# Patient Record
Sex: FEMALE | Race: BLACK OR AFRICAN AMERICAN | Employment: FULL TIME | URBAN - METROPOLITAN AREA
[De-identification: names, ages, dates, MRNs, and addresses within clinical notes are randomized per-mention and may not be internally consistent; named-entity substitution may affect disease eponyms.]

---

## 2016-07-05 LAB — GRBS, EXTERNAL: NORMAL

## 2016-12-29 LAB
ANTIBODY SCREEN, EXTERNAL: NORMAL
CHLAMYDIA, EXTERNAL: NORMAL
HBSAG, EXTERNAL: NORMAL
HIV, EXTERNAL: NORMAL
N. GONORRHEA, EXTERNAL: NORMAL
RPR, EXTERNAL: NORMAL
RUBELLA, EXTERNAL: NORMAL
TYPE, ABO & RH, EXTERNAL: NORMAL

## 2017-07-13 ENCOUNTER — HOSPITAL ENCOUNTER (EMERGENCY)
Age: 32
Discharge: STILL ADMITTED OR ADMINISTRATIVE | End: 2017-07-13
Attending: FAMILY MEDICINE

## 2017-07-17 ENCOUNTER — HOSPITAL ENCOUNTER (OUTPATIENT)
Age: 32
Setting detail: OBSERVATION
Discharge: HOME | End: 2017-07-17
Attending: OBSTETRICS & GYNECOLOGY | Admitting: OBSTETRICS & GYNECOLOGY
Payer: COMMERCIAL

## 2017-07-17 VITALS
HEART RATE: 92 BPM | DIASTOLIC BLOOD PRESSURE: 51 MMHG | TEMPERATURE: 98.2 F | RESPIRATION RATE: 16 BRPM | BODY MASS INDEX: 36.19 KG/M2 | HEIGHT: 64 IN | WEIGHT: 212 LBS | SYSTOLIC BLOOD PRESSURE: 102 MMHG

## 2017-07-17 PROBLEM — Z33.1 IUP (INTRAUTERINE PREGNANCY), INCIDENTAL: Status: ACTIVE | Noted: 2017-07-17

## 2017-07-17 PROCEDURE — 65270000029 HC RM PRIVATE

## 2017-07-17 PROCEDURE — 59025 FETAL NON-STRESS TEST: CPT

## 2017-07-17 PROCEDURE — 99218 HC RM OBSERVATION: CPT

## 2017-07-17 RX ORDER — METFORMIN HYDROCHLORIDE 1000 MG/1
1000 TABLET ORAL 2 TIMES DAILY WITH MEALS
COMMUNITY
End: 2019-04-03

## 2017-07-17 NOTE — IP AVS SNAPSHOT
Summary of Care Report The Summary of Care report has been created to help improve care coordination. Users with access to [employer] or [address] (Web-based application) may access additional patient information including the Discharge Summary. If you are not currently a [address] user and need more information, please call the number listed below in the [place] [address] section and ask to be connected with Medical Records. Facility Information Name Address Phone [address] [address] [phone] Patient Information Patient Name Sex BRENDA Walton ([id number]) Female 1985 Discharge Information Admitting Provider Service Area Unit Chiquita Almodovar MD / [phone] [address] [place] [address] / [phone] Discharge Provider Discharge Date/Time Discharge Disposition Destination (none) 2017 (Pending) AHR (none) Patient Language Language ENGLISH [13] Hospital Problems as of 2017  Never Reviewed Class Noted - Resolved Last Modified POA Active Problems IUP (intrauterine pregnancy), incidental  2017 - Present 2017 by Chiquita Almodovar MD Unknown Entered by Chiquita Almodovar MD  
  
You are allergic to the following No active allergies Current Discharge Medication List  
  
ASK your doctor about these medications Dose & Instructions Dispensing Information Comments  
 metFORMIN 1,000 mg tablet Commonly known as:  GLUCOPHAGE Dose:  1000 mg Take 1,000 mg by mouth two (2) times daily (with meals). Refills:  0 PNV-DHA PO Take  by mouth. Refills:  0 Follow-up Information Follow up With Details Comments Contact Info None   None (395) Patient stated that they have no PCP Discharge Instructions Counting Your Baby's Kicks: Care Instructions Your Care Instructions Counting your baby's kicks is one way your doctor can tell that your baby is healthy. Most womenespecially in a first pregnancyfeel their baby move for the first time between 16 and 22 weeks. The movement may feel like flutters rather than kicks. Your baby may move more at certain times of the day. When you are active, you may notice less kicking than when you are resting. At your prenatal visits, your doctor will ask whether the baby is active. In your last trimester, your doctor may ask you to count the number of times you feel your baby move. Follow-up care is a key part of your treatment and safety. Be sure to make and go to all appointments, and call your doctor if you are having problems. It's also a good idea to know your test results and keep a list of the medicines you take. How do you count fetal kicks? · A common method of checking your baby's movement is to count the number of kicks or moves you feel in 1 hour. Ten movements (such as kicks, flutters, or rolls) in 1 hour are normal. Some doctors suggest that you count in the morning until you get to 10 movements. Then you can quit for that day and start again the next day. · Pick your baby's most active time of day to count. This may be any time from morning to evening. · If you do not feel 10 movements in an hour, your baby may be sleeping. Wait for the next hour and count again. When should you call for help? Call your doctor now or seek immediate medical care if: 
· You noticed that your baby has stopped moving or is moving much less than normal. 
Watch closely for changes in your health, and be sure to contact your doctor if you have any problems. Where can you learn more? Go to http://katiuska-elisabeth.info/. Enter P902 in the search box to learn more about \"Counting Your Baby's Kicks: Care Instructions. \" Current as of: March 16, 2017 Content Version: 11.3 © 2299-1602 [employer]. Care instructions adapted under license by [employer] (which disclaims liability or warranty for this information). If you have questions about a medical condition or this instruction, always ask your healthcare professional. Tanmay Bullock any warranty or liability for your use of this information. Week 37 of Your Pregnancy: Care Instructions Your Care Instructions You are near the end of your pregnancyand you're probably pretty uncomfortable. It may be harder to walk around. Lying down probably isn't comfortable either. You may have trouble getting to sleep or staying asleep. Most women deliver their babies between 40 and 41 weeks. This is a good time to think about packing a bag for the hospital with items you'll need. Then you'll be ready when labor starts. Follow-up care is a key part of your treatment and safety. Be sure to make and go to all appointments, and call your doctor if you are having problems. It's also a good idea to know your test results and keep a list of the medicines you take. How can you care for yourself at home? Learn about breastfeeding · Breastfeeding is best for your baby and good for you. · Breast milk has antibodies to help your baby fight infections. · Mothers who breastfeed often lose weight faster, because making milk burns calories. · Learning the best ways to hold your baby will make breastfeeding easier. · Let your partner bathe and diaper the baby to keep your partner from feeling left out. Snuggle together when you breastfeed. · You may want to learn how to use a breast pump and store your milk. · If you choose to bottle feed, make the feeding feel like breastfeeding so you can bond with your baby. Always hold your baby and the bottle. Do not prop bottles or let your baby fall asleep with a bottle. Learn about crying · It is common for babies to cry for 1 to 3 hours a day. Some cry more, some cry less. · Babies don't cry to make you upset or because you are a bad parent. · Crying is how your baby communicates. Your baby may be hungry; have gas; need a diaper change; or feel cold, warm, tired, lonely, or tense. Sometimes babies cry for unknown reasons. · If you respond to your baby's needs, he or she will learn to trust you. · Try to stay calm when your baby cries. Your baby may get more upset if he or she senses that you are upset. Know how to care for your  · Your baby's umbilical cord stump will drop off on its own, usually between 1 and 2 weeks. To care for your baby's umbilical cord area: ¨ Clean the area at the bottom of the cord 2 or 3 times a day. ¨ Pay special attention to the area where the cord attaches to the skin. ¨ Keep the diaper folded below the cord. ¨ Use a damp washcloth or cotton ball to sponge bathe your baby until the stump has come off. · Your baby's first dark stool is called meconium. After the meconium is passed, your baby will develop his or her own bowel pattern. ¨ Some babies, especially  babies, have several bowel movements a day. Others have one or two a day, or one every 2 to 3 days. ¨  babies often have loose, yellow stools. Formula-fed babies have more formed stools. ¨ If your baby's stools look like little pellets, he or she is constipated. After 2 days of constipation, call your baby's doctor. · If your baby will be circumcised, you can care for him at home. ¨ Gently rinse his penis with warm water after every diaper change. Do not try to remove the film that forms on the penis. This film will go away on its own. Pat dry. ¨ Put petroleum ointment, such as Vaseline, on the area of the diaper that will touch your baby's penis. This will keep the diaper from sticking to your baby. ¨ Ask the doctor about giving your baby acetaminophen (Tylenol) for pain. Where can you learn more? Go to http://katiuska-elisabeth.info/. Enter 68 21 [id number] in the search box to learn more about \"Week 37 of Your Pregnancy: Care Instructions. \" Current as of: March 16, 2017 Content Version: 11.3 © 7226-5701 [employer]. Care instructions adapted under license by [employer] (which disclaims liability or warranty for this information). If you have questions about a medical condition or this instruction, always ask your healthcare professional. [place] [address] any warranty or liability for your use of this information. Learning About When to Call Your Doctor During Pregnancy (After 20 Weeks) Your Care Instructions It's common to have concerns about what might be a problem during pregnancy. Although most pregnant women don't have any serious problems, it's important to know when to call your doctor if you have certain symptoms or signs of labor. These are general suggestions. Your doctor may give you some more information about when to call. When to call your doctor (after 20 weeks) Call 911 anytime you think you may need emergency care. For example, call if: 
· You have severe vaginal bleeding. · You have sudden, severe pain in your belly. · You passed out (lost consciousness). · You have a seizure. · You see or feel the umbilical cord. · You think you are about to deliver your baby and can't make it safely to the hospital. 
Call your doctor now or seek immediate medical care if: 
· You have vaginal bleeding. · You have belly pain. · You have a fever. · You have symptoms of preeclampsia, such as: 
¨ Sudden swelling of your face, hands, or feet. ¨ New vision problems (such as dimness or blurring). ¨ A severe headache. · You have a sudden release of fluid from your vagina. (You think your water broke.) · You think that you may be in labor.  This means that you've had at least 4 contractions within 20 minutes or at least 8 contractions in an hour. · You notice that your baby has stopped moving or is moving much less than normal. 
· You have symptoms of a urinary tract infection. These may include: 
¨ Pain or burning when you urinate. ¨ A frequent need to urinate without being able to pass much urine. ¨ Pain in the flank, which is just below the rib cage and above the waist on either side of the back. ¨ Blood in your urine. Watch closely for changes in your health, and be sure to contact your doctor if: 
· You have vaginal discharge that smells bad. · You have skin changes, such as: ¨ A rash. ¨ Itching. ¨ Yellow color to your skin. · You have other concerns about your pregnancy. If you have labor signs at 37 weeks or more If you have signs of labor at 37 weeks or more, your doctor may tell you to call when your labor becomes more active. Symptoms of active labor include: 
· Contractions that are regular. · Contractions that are less than 5 minutes apart. · Contractions that are hard to talk through. Follow-up care is a key part of your treatment and safety. Be sure to make and go to all appointments, and call your doctor if you are having problems. It's also a good idea to know your test results and keep a list of the medicines you take. Where can you learn more? Go to http://katiuska-elisabeth.info/. Enter  in the search box to learn more about \"Learning About When to Call Your Doctor During Pregnancy (After 20 Weeks). \" 
Current as of: March 16, 2017 Content Version: 11.3 © 7126-0928 [employer]. Care instructions adapted under license by [employer] (which disclaims liability or warranty for this information). If you have questions about a medical condition or this instruction, always ask your healthcare professional. [place] [address] any warranty or liability for your use of this information. Chart Review Routing History No Routing History on File

## 2017-07-17 NOTE — DISCHARGE INSTRUCTIONS
Counting Your Baby's Kicks: Care Instructions  Your Care Instructions  Counting your baby's kicks is one way your doctor can tell that your baby is healthy. Most women--especially in a first pregnancy--feel their baby move for the first time between 16 and 22 weeks. The movement may feel like flutters rather than kicks. Your baby may move more at certain times of the day. When you are active, you may notice less kicking than when you are resting. At your prenatal visits, your doctor will ask whether the baby is active. In your last trimester, your doctor may ask you to count the number of times you feel your baby move. Follow-up care is a key part of your treatment and safety. Be sure to make and go to all appointments, and call your doctor if you are having problems. It's also a good idea to know your test results and keep a list of the medicines you take. How do you count fetal kicks? · A common method of checking your baby's movement is to count the number of kicks or moves you feel in 1 hour. Ten movements (such as kicks, flutters, or rolls) in 1 hour are normal. Some doctors suggest that you count in the morning until you get to 10 movements. Then you can quit for that day and start again the next day. · Pick your baby's most active time of day to count. This may be any time from morning to evening. · If you do not feel 10 movements in an hour, your baby may be sleeping. Wait for the next hour and count again. When should you call for help? Call your doctor now or seek immediate medical care if:  · You noticed that your baby has stopped moving or is moving much less than normal.  Watch closely for changes in your health, and be sure to contact your doctor if you have any problems. Where can you learn more? Go to http://katiuska-elisabeth.info/. Enter Y868 in the search box to learn more about \"Counting Your Baby's Kicks: Care Instructions. \"  Current as of: March 16, 2017  Content Version: 11.3  © 8693-7590 [employer]. Care instructions adapted under license by [employer] (which disclaims liability or warranty for this information). If you have questions about a medical condition or this instruction, always ask your healthcare professional. Tanmay Bullock any warranty or liability for your use of this information. Week 37 of Your Pregnancy: Care Instructions  Your Care Instructions    You are near the end of your pregnancy--and you're probably pretty uncomfortable. It may be harder to walk around. Lying down probably isn't comfortable either. You may have trouble getting to sleep or staying asleep. Most women deliver their babies between 40 and 41 weeks. This is a good time to think about packing a bag for the hospital with items you'll need. Then you'll be ready when labor starts. Follow-up care is a key part of your treatment and safety. Be sure to make and go to all appointments, and call your doctor if you are having problems. It's also a good idea to know your test results and keep a list of the medicines you take. How can you care for yourself at home? Learn about breastfeeding  · Breastfeeding is best for your baby and good for you. · Breast milk has antibodies to help your baby fight infections. · Mothers who breastfeed often lose weight faster, because making milk burns calories. · Learning the best ways to hold your baby will make breastfeeding easier. · Let your partner bathe and diaper the baby to keep your partner from feeling left out. Snuggle together when you breastfeed. · You may want to learn how to use a breast pump and store your milk. · If you choose to bottle feed, make the feeding feel like breastfeeding so you can bond with your baby. Always hold your baby and the bottle. Do not prop bottles or let your baby fall asleep with a bottle.   Learn about crying  · It is common for babies to cry for 1 to 3 hours a day. Some cry more, some cry less. · Babies don't cry to make you upset or because you are a bad parent. · Crying is how your baby communicates. Your baby may be hungry; have gas; need a diaper change; or feel cold, warm, tired, lonely, or tense. Sometimes babies cry for unknown reasons. · If you respond to your baby's needs, he or she will learn to trust you. · Try to stay calm when your baby cries. Your baby may get more upset if he or she senses that you are upset. Know how to care for your   · Your baby's umbilical cord stump will drop off on its own, usually between 1 and 2 weeks. To care for your baby's umbilical cord area:  ¨ Clean the area at the bottom of the cord 2 or 3 times a day. ¨ Pay special attention to the area where the cord attaches to the skin. ¨ Keep the diaper folded below the cord. ¨ Use a damp washcloth or cotton ball to sponge bathe your baby until the stump has come off. · Your baby's first dark stool is called meconium. After the meconium is passed, your baby will develop his or her own bowel pattern. ¨ Some babies, especially  babies, have several bowel movements a day. Others have one or two a day, or one every 2 to 3 days. ¨  babies often have loose, yellow stools. Formula-fed babies have more formed stools. ¨ If your baby's stools look like little pellets, he or she is constipated. After 2 days of constipation, call your baby's doctor. · If your baby will be circumcised, you can care for him at home. ¨ Gently rinse his penis with warm water after every diaper change. Do not try to remove the film that forms on the penis. This film will go away on its own. Pat dry. ¨ Put petroleum ointment, such as Vaseline, on the area of the diaper that will touch your baby's penis. This will keep the diaper from sticking to your baby. ¨ Ask the doctor about giving your baby acetaminophen (Tylenol) for pain. Where can you learn more?   Go to http://sindy.info/. Enter 68 21 97 in the search box to learn more about \"Week 37 of Your Pregnancy: Care Instructions. \"  Current as of: March 16, 2017  Content Version: 11.3  © 7827-1447 [employer]. Care instructions adapted under license by [employer] (which disclaims liability or warranty for this information). If you have questions about a medical condition or this instruction, always ask your healthcare professional. [place] [address] any warranty or liability for your use of this information. Learning About When to Call Your Doctor During Pregnancy (After 20 Weeks)  Your Care Instructions  It's common to have concerns about what might be a problem during pregnancy. Although most pregnant women don't have any serious problems, it's important to know when to call your doctor if you have certain symptoms or signs of labor. These are general suggestions. Your doctor may give you some more information about when to call. When to call your doctor (after 20 weeks)  Call 911 anytime you think you may need emergency care. For example, call if:  · You have severe vaginal bleeding. · You have sudden, severe pain in your belly. · You passed out (lost consciousness). · You have a seizure. · You see or feel the umbilical cord. · You think you are about to deliver your baby and can't make it safely to the hospital.  Call your doctor now or seek immediate medical care if:  · You have vaginal bleeding. · You have belly pain. · You have a fever. · You have symptoms of preeclampsia, such as:  ¨ Sudden swelling of your face, hands, or feet. ¨ New vision problems (such as dimness or blurring). ¨ A severe headache. · You have a sudden release of fluid from your vagina. (You think your water broke.)  · You think that you may be in labor.  This means that you've had at least 4 contractions within 20 minutes or at least 8 contractions in an hour.  · You notice that your baby has stopped moving or is moving much less than normal.  · You have symptoms of a urinary tract infection. These may include:  ¨ Pain or burning when you urinate. ¨ A frequent need to urinate without being able to pass much urine. ¨ Pain in the flank, which is just below the rib cage and above the waist on either side of the back. ¨ Blood in your urine. Watch closely for changes in your health, and be sure to contact your doctor if:  · You have vaginal discharge that smells bad. · You have skin changes, such as:  ¨ A rash. ¨ Itching. ¨ Yellow color to your skin. · You have other concerns about your pregnancy. If you have labor signs at 37 weeks or more  If you have signs of labor at 37 weeks or more, your doctor may tell you to call when your labor becomes more active. Symptoms of active labor include:  · Contractions that are regular. · Contractions that are less than 5 minutes apart. · Contractions that are hard to talk through. Follow-up care is a key part of your treatment and safety. Be sure to make and go to all appointments, and call your doctor if you are having problems. It's also a good idea to know your test results and keep a list of the medicines you take. Where can you learn more? Go to http://katiuska-elisabeth.info/. Enter  in the search box to learn more about \"Learning About When to Call Your Doctor During Pregnancy (After 20 Weeks). \"  Current as of: March 16, 2017  Content Version: 11.3  © 2292-2620 [email or web address]. Care instructions adapted under license by [employer] (which disclaims liability or warranty for this information). If you have questions about a medical condition or this instruction, always ask your healthcare professional. [place] [address] any warranty or liability for your use of this information.

## 2017-07-17 NOTE — PROGRESS NOTES
1455 patient arrived to unit for prolonged monitoring. 1510 patient turned to left lateral, us and toco adjusted. 1600 Dr. Carlito Abdi reviewed [address] tracing, discharge orders receieved. 1605 discharge instructions reviewed, patient verbalizes understanding. Denies needs    1610 patient ambulates off unit at this time.

## 2017-07-17 NOTE — IP AVS SNAPSHOT
[address] [place] [address] 
[phone] Patient: Asia Preston MRN: [id number] You are allergic to the following No active allergies Recent Documentation Height Weight BMI OB Status Smoking Status 1.626 m 96.2 kg 36.39 kg/m2 Pregnant Never Smoker Emergency Contacts Name Discharge Info Relation Home Work Mobile Lyubov Andino  Parent [1] [phone] About your hospitalization You were admitted on:  July 17, 2017 You last received care in the:  [place] [address] [facility] You were discharged on:  July 17, 2017 Unit phone number:  [phone] Why you were hospitalized Your primary diagnosis was:  Not on File Your diagnoses also included:  Iup (Intrauterine Pregnancy), Incidental  
  
  
 
  
  
Providers Seen During Your Hospitalizations Provider Role Specialty Primary office phone Chiquita Almodovar MD Attending Provider Obstetrics & Gynecology [phone] Your Primary Care Physician (PCP) Primary Care Physician Office Phone Office Fax NONE ** None ** ** None ** Follow-up Information Follow up With Details Comments Contact Info None   None ([phone]) Patient stated that they have no PCP Current Discharge Medication List  
  
ASK your doctor about these medications Dose & Instructions Dispensing Information Comments Morning Noon Evening Bedtime  
 metFORMIN 1,000 mg tablet Commonly known as:  GLUCOPHAGE Your last dose was: Your next dose is:    
   
   
 Dose:  1000 mg Take 1,000 mg by mouth two (2) times daily (with meals). Refills:  0 PNV-DHA PO Your last dose was: Your next dose is: Take  by mouth. Refills:  0 Discharge Instructions Counting Your Baby's Kicks: Care Instructions Your Care Instructions Counting your baby's kicks is one way your doctor can tell that your baby is healthy. Most womenespecially in a first pregnancyfeel their baby move for the first time between 16 and 22 weeks. The movement may feel like flutters rather than kicks. Your baby may move more at certain times of the day. When you are active, you may notice less kicking than when you are resting. At your prenatal visits, your doctor will ask whether the baby is active. In your last trimester, your doctor may ask you to count the number of times you feel your baby move. Follow-up care is a key part of your treatment and safety. Be sure to make and go to all appointments, and call your doctor if you are having problems. It's also a good idea to know your test results and keep a list of the medicines you take. How do you count fetal kicks? · A common method of checking your baby's movement is to count the number of kicks or moves you feel in 1 hour. Ten movements (such as kicks, flutters, or rolls) in 1 hour are normal. Some doctors suggest that you count in the morning until you get to 10 movements. Then you can quit for that day and start again the next day. · Pick your baby's most active time of day to count. This may be any time from morning to evening. · If you do not feel 10 movements in an hour, your baby may be sleeping. Wait for the next hour and count again. When should you call for help? Call your doctor now or seek immediate medical care if: 
· You noticed that your baby has stopped moving or is moving much less than normal. 
Watch closely for changes in your health, and be sure to contact your doctor if you have any problems. Where can you learn more? Go to http://katiuska-elisabeth.info/. Enter N693 in the search box to learn more about \"Counting Your Baby's Kicks: Care Instructions. \" Current as of: March 16, 2017 Content Version: 11.3 © 9209-0062 Healthwise, [employer]. Care instructions adapted under license by Combined Power (which disclaims liability or warranty for this information). If you have questions about a medical condition or this instruction, always ask your healthcare professional. [address]  any warranty or liability for your use of this information. Week 37 of Your Pregnancy: Care Instructions Your Care Instructions You are near the end of your pregnancyand you're probably pretty uncomfortable. It may be harder to walk around. Lying down probably isn't comfortable either. You may have trouble getting to sleep or staying asleep. Most women deliver their babies between 40 and 41 weeks. This is a good time to think about packing a bag for the hospital with items you'll need. Then you'll be ready when labor starts. Follow-up care is a key part of your treatment and safety. Be sure to make and go to all appointments, and call your doctor if you are having problems. It's also a good idea to know your test results and keep a list of the medicines you take. How can you care for yourself at home? Learn about breastfeeding · Breastfeeding is best for your baby and good for you. · Breast milk has antibodies to help your baby fight infections. · Mothers who breastfeed often lose weight faster, because making milk burns calories. · Learning the best ways to hold your baby will make breastfeeding easier. · Let your partner bathe and diaper the baby to keep your partner from feeling left out. Snuggle together when you breastfeed. · You may want to learn how to use a breast pump and store your milk. · If you choose to bottle feed, make the feeding feel like breastfeeding so you can bond with your baby. Always hold your baby and the bottle. Do not prop bottles or let your baby fall asleep with a bottle. Learn about crying · It is common for babies to cry for 1 to 3 hours a day. Some cry more, some cry less. · Babies don't cry to make you upset or because you are a bad parent. · Crying is how your baby communicates. Your baby may be hungry; have gas; need a diaper change; or feel cold, warm, tired, lonely, or tense. Sometimes babies cry for unknown reasons. · If you respond to your baby's needs, he or she will learn to trust you. · Try to stay calm when your baby cries. Your baby may get more upset if he or she senses that you are upset. Know how to care for your  · Your baby's umbilical cord stump will drop off on its own, usually between 1 and 2 weeks. To care for your baby's umbilical cord area: ¨ Clean the area at the bottom of the cord 2 or 3 times a day. ¨ Pay special attention to the area where the cord attaches to the skin. ¨ Keep the diaper folded below the cord. ¨ Use a damp washcloth or cotton ball to sponge bathe your baby until the stump has come off. · Your baby's first dark stool is called meconium. After the meconium is passed, your baby will develop his or her own bowel pattern. ¨ Some babies, especially  babies, have several bowel movements a day. Others have one or two a day, or one every 2 to 3 days. ¨  babies often have loose, yellow stools. Formula-fed babies have more formed stools. ¨ If your baby's stools look like little pellets, he or she is constipated. After 2 days of constipation, call your baby's doctor. · If your baby will be circumcised, you can care for him at home. ¨ Gently rinse his penis with warm water after every diaper change. Do not try to remove the film that forms on the penis. This film will go away on its own. Pat dry. ¨ Put petroleum ointment, such as Vaseline, on the area of the diaper that will touch your baby's penis. This will keep the diaper from sticking to your baby. ¨ Ask the doctor about giving your baby acetaminophen (Tylenol) for pain. Where can you learn more? Go to http://katiuska-elisabeth.info/. Enter 68 [id number] in the search box to learn more about \"Week 37 of Your Pregnancy: Care Instructions. \" Current as of: March 16, 2017 Content Version: 11.3 © 2351-6008 [employer]. Care instructions adapted under license by [employer] (which disclaims liability or warranty for this information). If you have questions about a medical condition or this instruction, always ask your healthcare professional. [place] [address] any warranty or liability for your use of this information. Learning About When to Call Your Doctor During Pregnancy (After 20 Weeks) Your Care Instructions It's common to have concerns about what might be a problem during pregnancy. Although most pregnant women don't have any serious problems, it's important to know when to call your doctor if you have certain symptoms or signs of labor. These are general suggestions. Your doctor may give you some more information about when to call. When to call your doctor (after 20 weeks) Call 911 anytime you think you may need emergency care. For example, call if: 
· You have severe vaginal bleeding. · You have sudden, severe pain in your belly. · You passed out (lost consciousness). · You have a seizure. · You see or feel the umbilical cord. · You think you are about to deliver your baby and can't make it safely to the hospital. 
Call your doctor now or seek immediate medical care if: 
· You have vaginal bleeding. · You have belly pain. · You have a fever. · You have symptoms of preeclampsia, such as: 
¨ Sudden swelling of your face, hands, or feet. ¨ New vision problems (such as dimness or blurring). ¨ A severe headache. · You have a sudden release of fluid from your vagina. (You think your water broke.) · You think that you may be in labor.  This means that you've had at least 4 contractions within 20 minutes or at least 8 contractions in an hour. · You notice that your baby has stopped moving or is moving much less than normal. 
· You have symptoms of a urinary tract infection. These may include: 
¨ Pain or burning when you urinate. ¨ A frequent need to urinate without being able to pass much urine. ¨ Pain in the flank, which is just below the rib cage and above the waist on either side of the back. ¨ Blood in your urine. Watch closely for changes in your health, and be sure to contact your doctor if: 
· You have vaginal discharge that smells bad. · You have skin changes, such as: ¨ A rash. ¨ Itching. ¨ Yellow color to your skin. · You have other concerns about your pregnancy. If you have labor signs at 37 weeks or more If you have signs of labor at 37 weeks or more, your doctor may tell you to call when your labor becomes more active. Symptoms of active labor include: 
· Contractions that are regular. · Contractions that are less than 5 minutes apart. · Contractions that are hard to talk through. Follow-up care is a key part of your treatment and safety. Be sure to make and go to all appointments, and call your doctor if you are having problems. It's also a good idea to know your test results and keep a list of the medicines you take. Where can you learn more? Go to http://katiuska-elisabeth.info/. Enter  in the search box to learn more about \"Learning About When to Call Your Doctor During Pregnancy (After 20 Weeks). \" 
Current as of: March 16, 2017 Content Version: 11.3 © 1946-3046 Healthwise, Incorporated. Care instructions adapted under license by [employer]mnis[employer] (which disclaims liability or warranty for this information). If you have questions about a medical condition or this instruction, always ask your healthcare professional. [place] [address] any warranty or liability for your use of this information. Discharge Orders None Introducing [facility]! Maryjane Prado introduces Doculogy patient portal. Now you can access parts of your medical record, email your doctor's office, and request medication refills online. 1. In your internet browser, go to [email or web address] 2. Click on the First Time User? Click Here link in the Sign In box. You will see the New Member Sign Up page. 3. Enter your Doculogy Access Code exactly as it appears below. You will not need to use this code after youve completed the sign-up process. If you do not sign up before the expiration date, you must request a new code. · Doculogy Access Code: [email or web address] Expires: 10/15/2017  4:02 PM 
 
4. Enter the last four digits of your Social Security Number (xxxx) and Date of Birth (mm/dd/yyyy) as indicated and click Submit. You will be taken to the next sign-up page. 5. Create a Doculogy ID. This will be your Doculogy login ID and cannot be changed, so think of one that is secure and easy to remember. 6. Create a Doculogy password. You can change your password at any time. 7. Enter your Password Reset Question and Answer. This can be used at a later time if you forget your password. 8. Enter your e-mail address. You will receive e-mail notification when new information is available in [address] 9. Click Sign Up. You can now view and download portions of your medical record. 10. Click the Download Summary menu link to download a portable copy of your medical information. If you have questions, please visit the Frequently Asked Questions section of the Doculogy website. Remember, Doculogy is NOT to be used for urgent needs. For medical emergencies, dial 911. Now available from your iPhone and Android! General Information Please provide this summary of care documentation to your next provider.  
  
  
    
    
 Patient Signature: ____________________________________________________________ Date:  ____________________________________________________________  
  
Gaston Landsman Provider Signature:  ____________________________________________________________ Date:  ____________________________________________________________

## 2017-07-25 ENCOUNTER — HOSPITAL ENCOUNTER (EMERGENCY)
Age: 32
Discharge: HOME | End: 2017-07-25
Attending: OBSTETRICS & GYNECOLOGY | Admitting: OBSTETRICS & GYNECOLOGY
Payer: COMMERCIAL

## 2017-07-25 VITALS
HEART RATE: 101 BPM | DIASTOLIC BLOOD PRESSURE: 67 MMHG | HEIGHT: 64 IN | BODY MASS INDEX: 36.02 KG/M2 | SYSTOLIC BLOOD PRESSURE: 118 MMHG | WEIGHT: 211 LBS | RESPIRATION RATE: 16 BRPM | TEMPERATURE: 98.4 F

## 2017-07-25 PROCEDURE — 59025 FETAL NON-STRESS TEST: CPT

## 2017-07-25 NOTE — DISCHARGE INSTRUCTIONS
Pregnancy Precautions: Care Instructions  Your Care Instructions  There is no sure way to prevent labor before your due date ( labor) or to prevent most other pregnancy problems. But there are things you can do to increase your chances of a healthy pregnancy. Go to your appointments, follow your doctor's advice, and take good care of yourself. Eat well, and exercise (if your doctor agrees). And make sure to drink plenty of water. Follow-up care is a key part of your treatment and safety. Be sure to make and go to all appointments, and call your doctor if you are having problems. It's also a good idea to know your test results and keep a list of the medicines you take. How can you care for yourself at home? · Make sure you go to your prenatal appointments. At each visit, your doctor will check your blood pressure. Your doctor will also check to see if you have protein in your urine. High blood pressure and protein in urine are signs of preeclampsia. This condition can be dangerous for you and your baby. · Drink plenty of fluids, enough so that your urine is light yellow or clear like water. Dehydration can cause contractions. If you have kidney, heart, or liver disease and have to limit fluids, talk with your doctor before you increase the amount of fluids you drink. · Tell your doctor right away if you notice any symptoms of an infection, such as:  ¨ Burning when you urinate. ¨ A foul-smelling discharge from your vagina. ¨ Vaginal itching. ¨ Unexplained fever. ¨ Unusual pain or soreness in your uterus or lower belly. · Eat a balanced diet. Include plenty of foods that are high in calcium and iron. ¨ Foods high in calcium include milk, cheese, yogurt, almonds, and broccoli. ¨ Foods high in iron include red meat, shellfish, poultry, eggs, beans, raisins, whole-grain bread, and leafy green vegetables. · Do not smoke.  If you need help quitting, talk to your doctor about stop-smoking programs and medicines. These can increase your chances of quitting for good. · Do not drink alcohol or use illegal drugs. · Follow your doctor's directions about activity. Your doctor will let you know how much, if any, exercise you can do. · Ask your doctor if you can have sex. If you are at risk for early labor, your doctor may ask you to not have sex. · Take care to prevent falls. During pregnancy, your joints are loose, and your balance is off. Sports such as bicycling, skiing, or in-line skating can increase your risk of falling. And don't ride horses or motorcycles, dive, water ski, scuba dive, or parachute jump while you are pregnant. · Avoid getting very hot. Do not use saunas or hot tubs. Avoid staying out in the sun in hot weather for long periods. Take acetaminophen (Tylenol) to lower a high fever. · Do not take any over-the-counter or herbal medicines or supplements without talking to your doctor or pharmacist first.  When should you call for help? Call 911 anytime you think you may need emergency care. For example, call if:  · You passed out (lost consciousness). · You have severe vaginal bleeding. · You have severe pain in your belly or pelvis. · You have had fluid gushing or leaking from your vagina and you know or think the umbilical cord is bulging into your vagina. If this happens, immediately get down on your knees so your rear end (buttocks) is higher than your head. This will decrease the pressure on the cord until help arrives. Call your doctor now or seek immediate medical care if:  · You have signs of preeclampsia, such as:  ¨ Sudden swelling of your face, hands, or feet. ¨ New vision problems (such as dimness or blurring). ¨ A severe headache. · You have any vaginal bleeding. · You have belly pain or cramping. · You have a fever. · You have had regular contractions (with or without pain) for an hour.  This means that you have 8 or more within 1 hour or 4 or more in 20 minutes after you change your position and drink fluids. · You have a sudden release of fluid from your vagina. · You have low back pain or pelvic pressure that does not go away. · You notice that your baby has stopped moving or is moving much less than normal.  Watch closely for changes in your health, and be sure to contact your doctor if you have any problems. Where can you learn more? Go to http://katiuska-elisabeth.info/. Enter [id number] in the search box to learn more about \"Pregnancy Precautions: Care Instructions. \"  Current as of: 2017  Content Version: 11.3  © 6600-1651 Airbiquity. Care instructions adapted under license by WhoAPI (which disclaims liability or warranty for this information). If you have questions about a medical condition or this instruction, always ask your healthcare professional. [place] [address] any warranty or liability for your use of this information. Week 38 of Your Pregnancy: Care Instructions  Your Care Instructions    Believe it or not, your baby is almost here. You may have ideas about your baby's personality because of how much he or she moves. Or you may have noticed how he or she responds to sounds, warmth, cold, and light. You may even know what kind of music your baby likes. By now, you have a better idea of what to expect during delivery. You may have talked about your birth preferences with your doctor. But even if you want a vaginal birth, it is a good idea to learn about  births.  birth means that your baby is born through a cut (incision) in your lower belly. It is sometimes the best choice for the health of the baby and the mother. This care sheet can help you understand  births. It also gives you information about what to expect after your baby is born. And it helps you understand more about postpartum depression. Follow-up care is a key part of your treatment and safety.  Be sure to make and go to all appointments, and call your doctor if you are having problems. It's also a good idea to know your test results and keep a list of the medicines you take. How can you care for yourself at home? Learn about  birth  · Most C-sections are unplanned. They are done because of problems that occur during labor. These problems might include:  ¨ Labor that slows or stops. ¨ High blood pressure or other problems for the mother. ¨ Signs of distress in the baby. These signs may include a very fast or slow heart rate. · Although most mothers and babies do well after , it is major surgery. It has more risks than a vaginal delivery. · In some cases, a planned  may be safer than a vaginal delivery. This may be the case if:  ¨ The mother has a health problem, such as a heart condition. ¨ The baby isn't in a head-down position for delivery. This is called a breech position. ¨ The uterus has scars from past surgeries. This could increase the chance of a tear in the uterus. ¨ There is a problem with the placenta. ¨ The mother has an infection, such as genital herpes, that could be spread to the baby. ¨ The mother is having twins or more. ¨ The baby weighs 9 to 10 pounds or more. · Because of the risks of , planned C-sections generally should be done only for medical reasons. And a planned  should be done at 39 weeks or later unless there is a medical reason to do it sooner. Know what to expect after delivery, and plan for the first few weeks at home  · You, your baby, and your partner or  will get identification bands. Only people with matching bands can  the baby from the nursery. · You will learn how to feed, diaper, and bathe your baby. And you will learn how to care for the umbilical cord stump. If your baby will be circumcised, you will also learn how to care for that. · Ask people to wait to visit you until you are at home.  And ask them to wash their hands before they touch your baby. · Make sure you have another adult in your home for at least 2 or 3 days after the birth. · During the first 2 weeks, limit when friends and family can visit. · Do not allow visitors who have colds or infections. Make sure all visitors are up to date with their vaccinations. Never let anyone smoke around your baby. · Try to nap when the baby naps. Be aware of postpartum depression  · \"Baby blues\" are common for the first 1 to 2 weeks after birth. You may cry or feel sad or irritable for no reason. · For some women, these feelings last longer and are more intense. This is called postpartum depression. · If your symptoms last for more than a few weeks or you feel very depressed, ask your doctor for help. · Postpartum depression can be treated. Support groups and counseling can help. Sometimes medicine can also help. Where can you learn more? Go to http://katiuska-elisabeth.info/. Enter B044 in the search box to learn more about \"Week 38 of Your Pregnancy: Care Instructions. \"  Current as of: March 16, 2017  Content Version: 11.3  © 2599-4691 RegainGo. Care instructions adapted under license by [employer] (which disclaims liability or warranty for this information). If you have questions about a medical condition or this instruction, always ask your healthcare professional. [address] any warranty or liability for your use of this information.

## 2017-07-25 NOTE — IP AVS SNAPSHOT
Teresamonie Renee 
 
 
 [address] [facility] [address] 
[phone] Patient: Lilliana Vee MRN: [id number] YSQ:[id number] Current Discharge Medication List  
  
ASK your doctor about these medications Dose & Instructions Dispensing Information Comments Morning Noon Evening Bedtime  
 metFORMIN 1,000 mg tablet Commonly known as:  GLUCOPHAGE Your last dose was: Your next dose is:    
   
   
 Dose:  1000 mg Take 1,000 mg by mouth two (2) times daily (with meals). Refills:  0 PNV-DHA PO Your last dose was: Your next dose is: Take  by mouth. Refills:  0

## 2017-07-25 NOTE — IP AVS SNAPSHOT
[facility] 
 
 
 [address] [facility] [address] 
[phone] Patient: Asia Preston MRN: [id number] KJQ:2/54/3232 You are allergic to the following No active allergies Recent Documentation Height Weight BMI OB Status Smoking Status 1.626 m 95.7 kg 36.22 kg/m2 Pregnant Never Smoker Emergency Contacts Name Discharge Info Relation Home Work Mobile Lyubov Andino  Parent [1] [phone] About your hospitalization You were admitted on:  N/A You last received care in the:  [place] [address] [facility] You were discharged on:  July 25, 2017 Unit phone number:  [phone] Why you were hospitalized Your primary diagnosis was:  Not on File Providers Seen During Your Hospitalizations Provider Role Specialty Primary office phone Chiquita Almodovar MD Attending Provider Obstetrics & Gynecology [phone] Your Primary Care Physician (PCP) Primary Care Physician Office Phone Office Fax NONE ** None ** ** None ** Follow-up Information Follow up With Details Comments Contact Info None   None (395) Patient stated that they have no PCP Current Discharge Medication List  
  
ASK your doctor about these medications Dose & Instructions Dispensing Information Comments Morning Noon Evening Bedtime  
 metFORMIN 1,000 mg tablet Commonly known as:  GLUCOPHAGE Your last dose was: Your next dose is:    
   
   
 Dose:  1000 mg Take 1,000 mg by mouth two (2) times daily (with meals). Refills:  0 PNV-DHA PO Your last dose was: Your next dose is: Take  by mouth. Refills:  0 Discharge Instructions Pregnancy Precautions: Care Instructions Your Care Instructions There is no sure way to prevent labor before your due date ( labor) or to prevent most other pregnancy problems. But there are things you can do to increase your chances of a healthy pregnancy. Go to your appointments, follow your doctor's advice, and take good care of yourself. Eat well, and exercise (if your doctor agrees). And make sure to drink plenty of water. Follow-up care is a key part of your treatment and safety. Be sure to make and go to all appointments, and call your doctor if you are having problems. It's also a good idea to know your test results and keep a list of the medicines you take. How can you care for yourself at home? · Make sure you go to your prenatal appointments. At each visit, your doctor will check your blood pressure. Your doctor will also check to see if you have protein in your urine. High blood pressure and protein in urine are signs of preeclampsia. This condition can be dangerous for you and your baby. · Drink plenty of fluids, enough so that your urine is light yellow or clear like water. Dehydration can cause contractions. If you have kidney, heart, or liver disease and have to limit fluids, talk with your doctor before you increase the amount of fluids you drink. · Tell your doctor right away if you notice any symptoms of an infection, such as: ¨ Burning when you urinate. ¨ A foul-smelling discharge from your vagina. ¨ Vaginal itching. ¨ Unexplained fever. ¨ Unusual pain or soreness in your uterus or lower belly. · Eat a balanced diet. Include plenty of foods that are high in calcium and iron. ¨ Foods high in calcium include milk, cheese, yogurt, almonds, and broccoli. ¨ Foods high in iron include red meat, shellfish, poultry, eggs, beans, raisins, whole-grain bread, and leafy green vegetables. · Do not smoke. If you need help quitting, talk to your doctor about stop-smoking programs and medicines. These can increase your chances of quitting for good. · Do not drink alcohol or use illegal drugs. · Follow your doctor's directions about activity. Your doctor will let you know how much, if any, exercise you can do. · Ask your doctor if you can have sex. If you are at risk for early labor, your doctor may ask you to not have sex. · Take care to prevent falls. During pregnancy, your joints are loose, and your balance is off. Sports such as bicycling, skiing, or in-line skating can increase your risk of falling. And don't ride horses or motorcycles, dive, water ski, scuba dive, or parachute jump while you are pregnant. · Avoid getting very hot. Do not use saunas or hot tubs. Avoid staying out in the sun in hot weather for long periods. Take acetaminophen (Tylenol) to lower a high fever. · Do not take any over-the-counter or herbal medicines or supplements without talking to your doctor or pharmacist first. 
When should you call for help? Call 911 anytime you think you may need emergency care. For example, call if: 
· You passed out (lost consciousness). · You have severe vaginal bleeding. · You have severe pain in your belly or pelvis. · You have had fluid gushing or leaking from your vagina and you know or think the umbilical cord is bulging into your vagina. If this happens, immediately get down on your knees so your rear end (buttocks) is higher than your head. This will decrease the pressure on the cord until help arrives. Call your doctor now or seek immediate medical care if: 
· You have signs of preeclampsia, such as: 
¨ Sudden swelling of your face, hands, or feet. ¨ New vision problems (such as dimness or blurring). ¨ A severe headache. · You have any vaginal bleeding. · You have belly pain or cramping. · You have a fever. · You have had regular contractions (with or without pain) for an hour. This means that you have 8 or more within 1 hour or 4 or more in 20 minutes after you change your position and drink fluids. · You have a sudden release of fluid from your vagina. · You have low back pain or pelvic pressure that does not go away. · You notice that your baby has stopped moving or is moving much less than normal. 
Watch closely for changes in your health, and be sure to contact your doctor if you have any problems. Where can you learn more? Go to http://katiuska-elisabeth.info/. Enter [id number] in the search box to learn more about \"Pregnancy Precautions: Care Instructions. \" Current as of: 2017 Content Version: 11.3 © 9120-0909 TouristR. Care instructions adapted under license by Gogiro (which disclaims liability or warranty for this information). If you have questions about a medical condition or this instruction, always ask your healthcare professional. [place] [address] any warranty or liability for your use of this information. Week 38 of Your Pregnancy: Care Instructions Your Care Instructions Believe it or not, your baby is almost here. You may have ideas about your baby's personality because of how much he or she moves. Or you may have noticed how he or she responds to sounds, warmth, cold, and light. You may even know what kind of music your baby likes. By now, you have a better idea of what to expect during delivery. You may have talked about your birth preferences with your doctor. But even if you want a vaginal birth, it is a good idea to learn about  births.  birth means that your baby is born through a cut (incision) in your lower belly. It is sometimes the best choice for the health of the baby and the mother. This care sheet can help you understand  births. It also gives you information about what to expect after your baby is born. And it helps you understand more about postpartum depression. Follow-up care is a key part of your treatment and safety.  Be sure to make and go to all appointments, and call your doctor if you are having problems. It's also a good idea to know your test results and keep a list of the medicines you take. How can you care for yourself at home? Learn about  birth · Most C-sections are unplanned. They are done because of problems that occur during labor. These problems might include: 
¨ Labor that slows or stops. ¨ High blood pressure or other problems for the mother. ¨ Signs of distress in the baby. These signs may include a very fast or slow heart rate. · Although most mothers and babies do well after , it is major surgery. It has more risks than a vaginal delivery. · In some cases, a planned  may be safer than a vaginal delivery. This may be the case if: ¨ The mother has a health problem, such as a heart condition. ¨ The baby isn't in a head-down position for delivery. This is called a breech position. ¨ The uterus has scars from past surgeries. This could increase the chance of a tear in the uterus. ¨ There is a problem with the placenta. ¨ The mother has an infection, such as genital herpes, that could be spread to the baby. ¨ The mother is having twins or more. ¨ The baby weighs 9 to 10 pounds or more. · Because of the risks of , planned C-sections generally should be done only for medical reasons. And a planned  should be done at 39 weeks or later unless there is a medical reason to do it sooner. Know what to expect after delivery, and plan for the first few weeks at home · You, your baby, and your partner or  will get identification bands. Only people with matching bands can  the baby from the nursery. · You will learn how to feed, diaper, and bathe your baby. And you will learn how to care for the umbilical cord stump. If your baby will be circumcised, you will also learn how to care for that. · Ask people to wait to visit you until you are at home.  And ask them to wash their hands before they touch your baby. · Make sure you have another adult in your home for at least 2 or 3 days after the birth. · During the first 2 weeks, limit when friends and family can visit. · Do not allow visitors who have colds or infections. Make sure all visitors are up to date with their vaccinations. Never let anyone smoke around your baby. · Try to nap when the baby naps. Be aware of postpartum depression · \"Baby blues\" are common for the first 1 to 2 weeks after birth. You may cry or feel sad or irritable for no reason. · For some women, these feelings last longer and are more intense. This is called postpartum depression. · If your symptoms last for more than a few weeks or you feel very depressed, ask your doctor for help. · Postpartum depression can be treated. Support groups and counseling can help. Sometimes medicine can also help. Where can you learn more? Go to http://katiuska-elisabeth.info/. Enter B044 in the search box to learn more about \"Week 38 of Your Pregnancy: Care Instructions. \" Current as of: March 16, 2017 Content Version: 11.3 © 7552-9300 Ziploop. Care instructions adapted under license by Tinitell (which disclaims liability or warranty for this information). If you have questions about a medical condition or this instruction, always ask your healthcare professional. [address] any warranty or liability for your use of this information. Discharge Orders None Introducing [facility]! Reta Zelaya introduces GaBoom patient portal. Now you can access parts of your medical record, email your doctor's office, and request medication refills online. 1. In your internet browser, go to [email or web address] 2. Click on the First Time User? Click Here link in the Sign In box. You will see the New Member Sign Up page. 3. Enter your Miner Access Code exactly as it appears below. You will not need to use this code after youve completed the sign-up process. If you do not sign up before the expiration date, you must request a new code. · Miner Access Code: [email or web address] Expires: 10/15/2017  4:02 PM 
 
4. Enter the last four digits of your Social Security Number (xxxx) and Date of Birth (mm/dd/yyyy) as indicated and click Submit. You will be taken to the next sign-up page. 5. Create a Miner ID. This will be your Miner login ID and cannot be changed, so think of one that is secure and easy to remember. 6. Create a Miner password. You can change your password at any time. 7. Enter your Password Reset Question and Answer. This can be used at a later time if you forget your password. 8. Enter your e-mail address. You will receive e-mail notification when new information is available in [address]. 9. Click Sign Up. You can now view and download portions of your medical record. 10. Click the Download Summary menu link to download a portable copy of your medical information. If you have questions, please visit the Frequently Asked Questions section of the Miner website. Remember, Miner is NOT to be used for urgent needs. For medical emergencies, dial 911. Now available from your iPhone and Android! General Information Please provide this summary of care documentation to your next provider. Patient Signature:  ____________________________________________________________ Date:  ____________________________________________________________  
  
Mora Livings Provider Signature:  ____________________________________________________________ Date:  ____________________________________________________________

## 2017-07-25 NOTE — PROGRESS NOTES
1500  at 38+4 weeks gestation arrived at L/D for NST sent from the doctor's office (non-reactive NST in office). [address] notified of reactive NST. Ordered to discharge patient. Patient scheduled for induction tomorrow. 1625 Patient verbalizes understanding of discharge instructions. Kick count information also reviewed. Patient states no questions. 1628 Patient ambulating off unit at this time.

## 2017-07-26 ENCOUNTER — HOSPITAL ENCOUNTER (INPATIENT)
Age: 32
LOS: 3 days | Discharge: HOME | End: 2017-07-29
Attending: OBSTETRICS & GYNECOLOGY | Admitting: OBSTETRICS & GYNECOLOGY
Payer: COMMERCIAL

## 2017-07-26 LAB
ABO + RH BLD: NORMAL
BASOPHILS # BLD AUTO: 0 K/UL (ref 0–0.06)
BASOPHILS # BLD: 0 % (ref 0–2)
BLOOD GROUP ANTIBODIES SERPL: NORMAL
DIFFERENTIAL METHOD BLD: ABNORMAL
EOSINOPHIL # BLD: 0.1 K/UL (ref 0–0.4)
EOSINOPHIL NFR BLD: 1 % (ref 0–5)
ERYTHROCYTE [DISTWIDTH] IN BLOOD BY AUTOMATED COUNT: 14.8 % (ref 11.6–14.5)
EST. AVERAGE GLUCOSE BLD GHB EST-MCNC: 137 MG/DL
GLUCOSE SERPL-MCNC: 124 MG/DL (ref 74–99)
HBA1C MFR BLD: 6.4 % (ref 4.5–5.6)
HCT VFR BLD AUTO: 37.5 % (ref 35–45)
HGB BLD-MCNC: 12.8 G/DL (ref 12–16)
LYMPHOCYTES # BLD AUTO: 25 % (ref 21–52)
LYMPHOCYTES # BLD: 2.2 K/UL (ref 0.9–3.6)
MCH RBC QN AUTO: 29 PG (ref 24–34)
MCHC RBC AUTO-ENTMCNC: 34.1 G/DL (ref 31–37)
MCV RBC AUTO: 85 FL (ref 74–97)
MONOCYTES # BLD: 0.7 K/UL (ref 0.05–1.2)
MONOCYTES NFR BLD AUTO: 8 % (ref 3–10)
NEUTS SEG # BLD: 5.7 K/UL (ref 1.8–8)
NEUTS SEG NFR BLD AUTO: 66 % (ref 40–73)
PLATELET # BLD AUTO: 162 K/UL (ref 135–420)
PMV BLD AUTO: 12.3 FL (ref 9.2–11.8)
RBC # BLD AUTO: 4.41 M/UL (ref 4.2–5.3)
SPECIMEN EXP DATE BLD: NORMAL
WBC # BLD AUTO: 8.7 K/UL (ref 4.6–13.2)

## 2017-07-26 PROCEDURE — 77030028565 HC CATH CERV RIPNG BLN COOK -B

## 2017-07-26 PROCEDURE — 65270000029 HC RM PRIVATE

## 2017-07-26 PROCEDURE — 59025 FETAL NON-STRESS TEST: CPT

## 2017-07-26 PROCEDURE — 86900 BLOOD TYPING SEROLOGIC ABO: CPT

## 2017-07-26 PROCEDURE — 82947 ASSAY GLUCOSE BLOOD QUANT: CPT

## 2017-07-26 PROCEDURE — 74011250636 HC RX REV CODE- 250/636: Performed by: ADVANCED PRACTICE MIDWIFE

## 2017-07-26 PROCEDURE — 36415 COLL VENOUS BLD VENIPUNCTURE: CPT

## 2017-07-26 PROCEDURE — 83036 HEMOGLOBIN GLYCOSYLATED A1C: CPT | Performed by: OBSTETRICS & GYNECOLOGY

## 2017-07-26 PROCEDURE — 85025 COMPLETE CBC W/AUTO DIFF WBC: CPT

## 2017-07-26 PROCEDURE — 3E033VJ INTRODUCTION OF OTHER HORMONE INTO PERIPHERAL VEIN, PERCUTANEOUS APPROACH: ICD-10-PCS | Performed by: OBSTETRICS & GYNECOLOGY

## 2017-07-26 RX ORDER — MINERAL OIL
30 OIL (ML) ORAL AS NEEDED
Status: DISCONTINUED | OUTPATIENT
Start: 2017-07-26 | End: 2017-07-27 | Stop reason: HOSPADM

## 2017-07-26 RX ORDER — SODIUM CHLORIDE, SODIUM LACTATE, POTASSIUM CHLORIDE, CALCIUM CHLORIDE 600; 310; 30; 20 MG/100ML; MG/100ML; MG/100ML; MG/100ML
125 INJECTION, SOLUTION INTRAVENOUS CONTINUOUS
Status: DISCONTINUED | OUTPATIENT
Start: 2017-07-26 | End: 2017-07-27 | Stop reason: HOSPADM

## 2017-07-26 RX ORDER — BUTORPHANOL TARTRATE 2 MG/ML
2 INJECTION INTRAMUSCULAR; INTRAVENOUS
Status: DISCONTINUED | OUTPATIENT
Start: 2017-07-26 | End: 2017-07-27 | Stop reason: HOSPADM

## 2017-07-26 RX ORDER — OXYTOCIN/RINGER'S LACTATE 20/1000 ML
500 PLASTIC BAG, INJECTION (ML) INTRAVENOUS ONCE
Status: ACTIVE | OUTPATIENT
Start: 2017-07-26 | End: 2017-07-27

## 2017-07-26 RX ORDER — LIDOCAINE HYDROCHLORIDE 10 MG/ML
20 INJECTION, SOLUTION EPIDURAL; INFILTRATION; INTRACAUDAL; PERINEURAL AS NEEDED
Status: DISCONTINUED | OUTPATIENT
Start: 2017-07-26 | End: 2017-07-27 | Stop reason: HOSPADM

## 2017-07-26 RX ORDER — NALBUPHINE HYDROCHLORIDE 10 MG/ML
10 INJECTION, SOLUTION INTRAMUSCULAR; INTRAVENOUS; SUBCUTANEOUS
Status: DISCONTINUED | OUTPATIENT
Start: 2017-07-26 | End: 2017-07-27 | Stop reason: HOSPADM

## 2017-07-26 RX ORDER — METHYLERGONOVINE MALEATE 0.2 MG/ML
0.2 INJECTION INTRAVENOUS AS NEEDED
Status: DISCONTINUED | OUTPATIENT
Start: 2017-07-26 | End: 2017-07-27 | Stop reason: HOSPADM

## 2017-07-26 RX ORDER — HYDROMORPHONE HYDROCHLORIDE 1 MG/ML
1 INJECTION, SOLUTION INTRAMUSCULAR; INTRAVENOUS; SUBCUTANEOUS
Status: DISCONTINUED | OUTPATIENT
Start: 2017-07-26 | End: 2017-07-27 | Stop reason: HOSPADM

## 2017-07-26 RX ORDER — TERBUTALINE SULFATE 1 MG/ML
0.25 INJECTION SUBCUTANEOUS
Status: DISCONTINUED | OUTPATIENT
Start: 2017-07-26 | End: 2017-07-27 | Stop reason: HOSPADM

## 2017-07-26 RX ORDER — OXYTOCIN/RINGER'S LACTATE 20/1000 ML
125 PLASTIC BAG, INJECTION (ML) INTRAVENOUS CONTINUOUS
Status: DISCONTINUED | OUTPATIENT
Start: 2017-07-26 | End: 2017-07-27 | Stop reason: HOSPADM

## 2017-07-26 RX ADMIN — BUTORPHANOL TARTRATE 2 MG: 2 INJECTION, SOLUTION INTRAMUSCULAR; INTRAVENOUS at 21:47

## 2017-07-26 RX ADMIN — SODIUM CHLORIDE, SODIUM LACTATE, POTASSIUM CHLORIDE, AND CALCIUM CHLORIDE 125 ML/HR: 600; 310; 30; 20 INJECTION, SOLUTION INTRAVENOUS at 19:20

## 2017-07-26 RX ADMIN — SODIUM CHLORIDE, SODIUM LACTATE, POTASSIUM CHLORIDE, AND CALCIUM CHLORIDE 125 ML/HR: 600; 310; 30; 20 INJECTION, SOLUTION INTRAVENOUS at 17:45

## 2017-07-26 NOTE — IP AVS SNAPSHOT
[address] [address] 
[phone] Patient: Jj Doherty MRN: [id number] TOMAS:1/97/0365 You are allergic to the following No active allergies Recent Documentation Height Weight Breastfeeding? BMI OB Status Smoking Status 1.575 m 96.2 kg Yes 38.78 kg/m2 Recent pregnancy Never Smoker Emergency Contacts Name Discharge Info Relation Home Work Mobile Juan Manuel Amel  Parent [1] [phone] About your hospitalization You were admitted on:  July 26, 2017 You last received care in the:  [address] [place] [address] You were discharged on:  July 29, 2017 Unit phone number:  [phone] Why you were hospitalized Your primary diagnosis was:  Not on File Your diagnoses also included:  Iup (Intrauterine Pregnancy), Incidental, Gestational Diabetes Providers Seen During Your Hospitalizations Provider Role Specialty Primary office phone Yovani Ferguson MD Attending Provider Obstetrics & Gynecology [phone] Your Primary Care Physician (PCP) Primary Care Physician Office Phone Office Fax NONE ** None ** ** None ** Follow-up Information Follow up With Details Comments Contact Info None   None (395) Patient stated that they have no PCP Current Discharge Medication List  
  
START taking these medications Dose & Instructions Dispensing Information Comments Morning Noon Evening Bedtime  
 ibuprofen 800 mg tablet Commonly known as:  MOTRIN Your last dose was: Your next dose is:    
   
   
 Dose:  800 mg Take 1 Tab by mouth every eight (8) hours as needed. Quantity:  60 Tab Refills:  0  
     
   
   
   
  
 oxyCODONE-acetaminophen 5-325 mg per tablet Commonly known as:  PERCOCET Your last dose was: Your next dose is:    
   
   
 Dose:  2 Tab Take 2 Tabs by mouth every four (4) hours as needed. Max Daily Amount: 12 Tabs. Quantity:  30 Tab Refills:  0 ASK your doctor about these medications Dose & Instructions Dispensing Information Comments Morning Noon Evening Bedtime  
 metFORMIN 1,000 mg tablet Commonly known as:  GLUCOPHAGE Your last dose was: Your next dose is:    
   
   
 Dose:  1000 mg Take 1,000 mg by mouth two (2) times daily (with meals). Refills:  0 PNV-DHA PO Your last dose was: Your next dose is: Take  by mouth. Refills:  0 Where to Get Your Medications Information on where to get these meds will be given to you by the nurse or doctor. ! Ask your nurse or doctor about these medications  
  ibuprofen 800 mg tablet  
 oxyCODONE-acetaminophen 5-325 mg per tablet Discharge Instructions POST DELIVERY DISCHARGE INSTRUCTIONS Name: Xiomara Garcia YOB: 1985 Primary Diagnosis: Active Problems: 
  IUP (intrauterine pregnancy), incidental (7/17/2017) Gestational diabetes (7/27/2017) Follow-Up Care: These are general instructions for a healthy lifestyle: No smoking/ No tobacco products/ Avoid exposure to second hand smoke Surgeon General's Warning:  Quitting smoking now greatly reduces serious risk to your health. Obesity, smoking, and sedentary lifestyle greatly increases your risk for illness A healthy diet, regular physical exercise & weight monitoring are important for maintaining a healthy lifestyle Recognize signs and symptoms of STROKE: 
 
F-face looks uneven A-arms unable to move or move unevenly S-speech slurred or non-existent T-time-call 911 as soon as signs and symptoms begin-DO NOT go Back to bed or wait to see if you get better-TIME IS BRAIN. Signed By: Chaya Shone                                                                                                   Date: 7/29/2017 Time: 2:30 PM 
 
Patient armband removed and given to patient to take home. Patient was informed of the privacy risks if armband lost or stolen Discharge Orders None [facility] Announcement We are excited to announce that we are making your provider's discharge notes available to you in [facility]. You will see these notes when they are completed and signed by the physician that discharged you from your recent hospital stay. If you have any questions or concerns about any information you see in [facility], please call the Health Information Department where you were seen or reach out to your Primary Care Provider for more information about your plan of care. Introducing [facility]! [employer] introduces Open mHealth patient portal. Now you can access parts of your medical record, email your doctor's office, and request medication refills online. 1. In your internet browser, go to [email or web address] 2. Click on the First Time User? Click Here link in the Sign In box. You will see the New Member Sign Up page. 3. Enter your Open [facility]Health Access Code exactly as it appears below. You will not need to use this code after youve completed the sign-up process. If you do not sign up before the expiration date, you must request a new code. · [facility] Access Code: [email or web address] Expires: 10/15/2017  4:02 PM 
 
4. Enter the last four digits of your Social Security Number (xxxx) and Date of Birth (mm/dd/yyyy) as indicated and click Submit. You will be taken to the next sign-up page. 5. Create a CaLivingBenefitst ID. This will be your Open mHealth login ID and cannot be changed, so think of one that is secure and easy to remember. 6. Create a CaLivingBenefitst password. You can change your password at any time. 7. Enter your Password Reset Question and Answer. This can be used at a later time if you forget your password. 8. Enter your e-mail address. You will receive e-mail notification when new information is available in [address] 9. Click Sign Up. You can now view and download portions of your medical record. 10. Click the Download Summary menu link to download a portable copy of your medical information. If you have questions, please visit the Frequently Asked Questions section of the CS Networks website. Remember, CS Networks is NOT to be used for urgent needs. For medical emergencies, dial 911. Now available from your iPhone and Android! General Information Please provide this summary of care documentation to your next provider. Patient Signature:  ____________________________________________________________ Date:  ____________________________________________________________  
  
Meño Pickens Provider Signature:  ____________________________________________________________ Date:  ____________________________________________________________

## 2017-07-26 NOTE — IP AVS SNAPSHOT
Summary of Care Report The Summary of Care report has been created to help improve care coordination. Users with access to GIDEEN or [facility] (Web-based application) may access additional patient information including the Discharge Summary. If you are not currently a [address] user and need more information, please call the number listed below in the [place] [address] section and ask to be connected with Medical Records. Facility Information Name Address Phone [address] [address] [phone] Patient Information Patient Name Sex BRENDA Lima ([id number]) Female 1985 Discharge Information Admitting Provider Service Area Unit Lefty Hanna MD / [phone] [address] / [phone] Discharge Provider Discharge Date/Time Discharge Disposition Destination (none) 2017 Midday (Pending) AHR (none) Patient Language Language ENGLISH [13] Hospital Problems as of 2017  Reviewed: 2017  7:42 AM by Edward Colbert MD  
  
  
  
 Class Noted - Resolved Last Modified POA Active Problems IUP (intrauterine pregnancy), incidental  2017 - Present 2017 by Barbara Covarrubias, CNM Unknown Entered by Lefty Hanna MD  
  Gestational diabetes  2017 - Present 2017 by Barbara Covarrubias, CNM Unknown Entered by Barbara Covarrubias CNM Non-Hospital Problems as of 2017  Reviewed: 2017  7:42 AM by Edward Colbert MD  
 None You are allergic to the following No active allergies Current Discharge Medication List  
  
START taking these medications Dose & Instructions Dispensing Information Comments  
 ibuprofen 800 mg tablet Commonly known as:  MOTRIN Dose:  800 mg Take 1 Tab by mouth every eight (8) hours as needed. Quantity:  60 Tab Refills:  0  
   
 oxyCODONE-acetaminophen 5-325 mg per tablet Commonly known as:  PERCOCET Dose:  2 Tab Take 2 Tabs by mouth every four (4) hours as needed. Max Daily Amount: 12 Tabs. Quantity:  30 Tab Refills:  0 ASK your doctor about these medications Dose & Instructions Dispensing Information Comments  
 metFORMIN 1,000 mg tablet Commonly known as:  GLUCOPHAGE Dose:  1000 mg Take 1,000 mg by mouth two (2) times daily (with meals). Refills:  0 PNV-DHA PO Take  by mouth. Refills:  0 Surgery Information ID Date/Time Status Primary Surgeon All Procedures Location [id number] 7/27/2017 [address] - DO NOT SCHEDULE Follow-up Information Follow up With Details Comments Contact Info None   None (395) Patient stated that they have no PCP Discharge Instructions POST DELIVERY DISCHARGE INSTRUCTIONS Name: Haily Way YOB: 1985 Primary Diagnosis: Active Problems: 
  IUP (intrauterine pregnancy), incidental (7/17/2017) Gestational diabetes (7/27/2017) Follow-Up Care: These are general instructions for a healthy lifestyle: No smoking/ No tobacco products/ Avoid exposure to second hand smoke Surgeon General's Warning:  Quitting smoking now greatly reduces serious risk to your health. Obesity, smoking, and sedentary lifestyle greatly increases your risk for illness A healthy diet, regular physical exercise & weight monitoring are important for maintaining a healthy lifestyle Recognize signs and symptoms of STROKE: 
 
F-face looks uneven A-arms unable to move or move unevenly S-speech slurred or non-existent T-time-call 911 as soon as signs and symptoms begin-DO NOT go Back to bed or wait to see if you get better-TIME IS BRAIN.  
 
 
 
Signed By: Jonelle Pabon Date: 7/29/2017 Time: 2:30 PM 
 
Patient armband removed and given to patient to take home. Patient was informed of the privacy risks if armband lost or stolen Chart Review Routing History No Routing History on File

## 2017-07-26 NOTE — PROGRESS NOTES
Pt arriving here for induction of labor. Escorted to BR 1- left to change into gown. Being induced due to gestational diabetes.

## 2017-07-26 NOTE — H&P
History & Physical    Name: Harrison Gilmore MRN: [id number]  SSN: [id number]    YOB: 1985  Age: 27 y.o. Sex: female        Subjective:     Estimated Date of Delivery: 17  OB History      Para Term  AB Living    4 2   1     SAB TAB Ectopic Molar Multiple Live Births    1               Ms. Selina Cates   is admitted with pregnancy at 38 weeks 5 days for induction of labor. Prenatal course was complicated by diabetes - gestational on medication and fetal arrhythmia. Please see prenatal records for details. PMHx, SHx, Family Hx, ROS unchanged from prenatal H&P. Group B Strep was negative. Objective:     Vitals:  Vitals:    17 1709   Weight: 96.2 kg (212 lb)   Height: 5' 2\" (1.575 m)        Physical Exam:  Cervical Exam  Membrane Status: Intact  Height 5' 2\" (1.575 m), weight 96.2 kg (212 lb). No data recorded. Data Review:   No results found for this or any previous visit (from the past 24 hour(s)). Monitor:  Reactivity:present Variability:present Baseline:within normal limits        Assessment/Plan:     Plan:  Admit for induction of labor, Risks/Benefits explained and Consent Signed. Subha Gonzalez balloon insertion.         Signed By:  Kate Charlton CNM     2017

## 2017-07-27 ENCOUNTER — ANESTHESIA EVENT (OUTPATIENT)
Dept: LABOR AND DELIVERY | Age: 32
End: 2017-07-27
Payer: COMMERCIAL

## 2017-07-27 ENCOUNTER — ANESTHESIA (OUTPATIENT)
Dept: LABOR AND DELIVERY | Age: 32
End: 2017-07-27
Payer: COMMERCIAL

## 2017-07-27 PROBLEM — O24.419 GESTATIONAL DIABETES: Status: ACTIVE | Noted: 2017-07-27

## 2017-07-27 LAB
GLUCOSE BLD STRIP.AUTO-MCNC: 119 MG/DL (ref 70–110)
GLUCOSE BLD STRIP.AUTO-MCNC: 120 MG/DL (ref 70–110)

## 2017-07-27 PROCEDURE — 75410000003 HC RECOV DEL/VAG/CSECN EA 0.5 HR

## 2017-07-27 PROCEDURE — 74011250636 HC RX REV CODE- 250/636: Performed by: ANESTHESIOLOGY

## 2017-07-27 PROCEDURE — 75410000000 HC DELIVERY VAGINAL/SINGLE

## 2017-07-27 PROCEDURE — 3E0R3CZ INTRODUCE REGIONAL ANESTH IN SPINAL CANAL, PERC: ICD-10-PCS | Performed by: ANESTHESIOLOGY

## 2017-07-27 PROCEDURE — 74011250636 HC RX REV CODE- 250/636: Performed by: ADVANCED PRACTICE MIDWIFE

## 2017-07-27 PROCEDURE — 77030034849

## 2017-07-27 PROCEDURE — 76060000078 HC EPIDURAL ANESTHESIA

## 2017-07-27 PROCEDURE — 74011250636 HC RX REV CODE- 250/636

## 2017-07-27 PROCEDURE — 65270000029 HC RM PRIVATE

## 2017-07-27 PROCEDURE — 00HU33Z INSERTION OF INFUSION DEVICE INTO SPINAL CANAL, PERCUTANEOUS APPROACH: ICD-10-PCS | Performed by: ANESTHESIOLOGY

## 2017-07-27 PROCEDURE — 75410000002 HC LABOR FEE PER 1 HR

## 2017-07-27 PROCEDURE — 74011250637 HC RX REV CODE- 250/637: Performed by: ADVANCED PRACTICE MIDWIFE

## 2017-07-27 PROCEDURE — 77030007879 HC KT SPN EPDRL TELE -B: Performed by: ANESTHESIOLOGY

## 2017-07-27 PROCEDURE — 82962 GLUCOSE BLOOD TEST: CPT

## 2017-07-27 PROCEDURE — 74011000250 HC RX REV CODE- 250

## 2017-07-27 RX ORDER — OXYTOCIN IN 5 % DEXTROSE 30/500 ML
.5-2 PLASTIC BAG, INJECTION (ML) INTRAVENOUS
Status: DISCONTINUED | OUTPATIENT
Start: 2017-07-27 | End: 2017-07-29 | Stop reason: HOSPADM

## 2017-07-27 RX ORDER — OXYCODONE AND ACETAMINOPHEN 5; 325 MG/1; MG/1
2 TABLET ORAL
Status: DISCONTINUED | OUTPATIENT
Start: 2017-07-27 | End: 2017-07-29 | Stop reason: HOSPADM

## 2017-07-27 RX ORDER — FENTANYL CITRATE 50 UG/ML
100 INJECTION, SOLUTION INTRAMUSCULAR; INTRAVENOUS ONCE
Status: DISCONTINUED | OUTPATIENT
Start: 2017-07-27 | End: 2017-07-27 | Stop reason: HOSPADM

## 2017-07-27 RX ORDER — NALOXONE HYDROCHLORIDE 0.4 MG/ML
0.2 INJECTION, SOLUTION INTRAMUSCULAR; INTRAVENOUS; SUBCUTANEOUS AS NEEDED
Status: DISCONTINUED | OUTPATIENT
Start: 2017-07-27 | End: 2017-07-27 | Stop reason: HOSPADM

## 2017-07-27 RX ORDER — FENTANYL CITRATE 50 UG/ML
INJECTION, SOLUTION INTRAMUSCULAR; INTRAVENOUS AS NEEDED
Status: DISCONTINUED | OUTPATIENT
Start: 2017-07-27 | End: 2017-07-27 | Stop reason: HOSPADM

## 2017-07-27 RX ORDER — ACETAMINOPHEN 325 MG/1
650 TABLET ORAL
Status: DISCONTINUED | OUTPATIENT
Start: 2017-07-27 | End: 2017-07-29 | Stop reason: HOSPADM

## 2017-07-27 RX ORDER — NALBUPHINE HYDROCHLORIDE 10 MG/ML
2.5 INJECTION, SOLUTION INTRAMUSCULAR; INTRAVENOUS; SUBCUTANEOUS
Status: DISCONTINUED | OUTPATIENT
Start: 2017-07-27 | End: 2017-07-27 | Stop reason: HOSPADM

## 2017-07-27 RX ORDER — LIDOCAINE HYDROCHLORIDE 10 MG/ML
INJECTION INFILTRATION; PERINEURAL
Status: DISPENSED
Start: 2017-07-27 | End: 2017-07-27

## 2017-07-27 RX ORDER — DIPHENHYDRAMINE HYDROCHLORIDE 50 MG/ML
25 INJECTION, SOLUTION INTRAMUSCULAR; INTRAVENOUS
Status: DISCONTINUED | OUTPATIENT
Start: 2017-07-27 | End: 2017-07-27 | Stop reason: HOSPADM

## 2017-07-27 RX ORDER — SODIUM CHLORIDE 0.9 % (FLUSH) 0.9 %
5-10 SYRINGE (ML) INJECTION EVERY 8 HOURS
Status: DISCONTINUED | OUTPATIENT
Start: 2017-07-27 | End: 2017-07-27 | Stop reason: HOSPADM

## 2017-07-27 RX ORDER — MINERAL OIL
30 OIL (ML) ORAL
Status: DISCONTINUED | OUTPATIENT
Start: 2017-07-27 | End: 2017-07-29 | Stop reason: HOSPADM

## 2017-07-27 RX ORDER — FENTANYL/ROPIVACAINE/NS/PF 2MCG/ML-.1
PLASTIC BAG, INJECTION (ML) EPIDURAL
Status: COMPLETED
Start: 2017-07-27 | End: 2017-07-27

## 2017-07-27 RX ORDER — IBUPROFEN 400 MG/1
800 TABLET ORAL
Status: DISCONTINUED | OUTPATIENT
Start: 2017-07-27 | End: 2017-07-29 | Stop reason: HOSPADM

## 2017-07-27 RX ORDER — ZOLPIDEM TARTRATE 5 MG/1
5 TABLET ORAL
Status: DISCONTINUED | OUTPATIENT
Start: 2017-07-27 | End: 2017-07-29 | Stop reason: HOSPADM

## 2017-07-27 RX ORDER — FENTANYL/ROPIVACAINE/NS/PF 2MCG/ML-.1
1-22 PLASTIC BAG, INJECTION (ML) EPIDURAL
Status: DISCONTINUED | OUTPATIENT
Start: 2017-07-27 | End: 2017-07-27 | Stop reason: HOSPADM

## 2017-07-27 RX ORDER — LIDOCAINE HYDROCHLORIDE AND EPINEPHRINE 15; 5 MG/ML; UG/ML
INJECTION, SOLUTION EPIDURAL AS NEEDED
Status: DISCONTINUED | OUTPATIENT
Start: 2017-07-27 | End: 2017-07-27 | Stop reason: HOSPADM

## 2017-07-27 RX ORDER — FENTANYL CITRATE 50 UG/ML
INJECTION, SOLUTION INTRAMUSCULAR; INTRAVENOUS
Status: DISPENSED
Start: 2017-07-27 | End: 2017-07-27

## 2017-07-27 RX ORDER — AMOXICILLIN 250 MG
1 CAPSULE ORAL
Status: DISCONTINUED | OUTPATIENT
Start: 2017-07-27 | End: 2017-07-29 | Stop reason: HOSPADM

## 2017-07-27 RX ORDER — PROMETHAZINE HYDROCHLORIDE 25 MG/ML
25 INJECTION, SOLUTION INTRAMUSCULAR; INTRAVENOUS
Status: DISCONTINUED | OUTPATIENT
Start: 2017-07-27 | End: 2017-07-29 | Stop reason: HOSPADM

## 2017-07-27 RX ORDER — SODIUM CHLORIDE 0.9 % (FLUSH) 0.9 %
5-10 SYRINGE (ML) INJECTION AS NEEDED
Status: DISCONTINUED | OUTPATIENT
Start: 2017-07-27 | End: 2017-07-27 | Stop reason: HOSPADM

## 2017-07-27 RX ADMIN — IBUPROFEN 800 MG: 400 TABLET, FILM COATED ORAL at 10:31

## 2017-07-27 RX ADMIN — Medication 125 ML/HR: at 08:58

## 2017-07-27 RX ADMIN — Medication 15 ML/HR: at 08:21

## 2017-07-27 RX ADMIN — PROMETHAZINE HYDROCHLORIDE 25 MG: 25 INJECTION INTRAMUSCULAR; INTRAVENOUS at 10:59

## 2017-07-27 RX ADMIN — Medication 6 MILLI-UNITS/MIN: at 07:04

## 2017-07-27 RX ADMIN — SODIUM CHLORIDE, SODIUM LACTATE, POTASSIUM CHLORIDE, AND CALCIUM CHLORIDE 1000 ML: 600; 310; 30; 20 INJECTION, SOLUTION INTRAVENOUS at 08:20

## 2017-07-27 RX ADMIN — SODIUM CHLORIDE, SODIUM LACTATE, POTASSIUM CHLORIDE, AND CALCIUM CHLORIDE 125 ML/HR: 600; 310; 30; 20 INJECTION, SOLUTION INTRAVENOUS at 02:38

## 2017-07-27 RX ADMIN — FENTANYL CITRATE 100 MCG: 50 INJECTION, SOLUTION INTRAMUSCULAR; INTRAVENOUS at 08:01

## 2017-07-27 RX ADMIN — LIDOCAINE HYDROCHLORIDE AND EPINEPHRINE 5 ML: 15; 5 INJECTION, SOLUTION EPIDURAL at 08:00

## 2017-07-27 RX ADMIN — BUTORPHANOL TARTRATE 2 MG: 2 INJECTION, SOLUTION INTRAMUSCULAR; INTRAVENOUS at 06:01

## 2017-07-27 NOTE — PROGRESS NOTES
2001 pt c/o pain and asking for pain meds, stadol given, fob at bedside  0500 elizalde balloon removed, cervix 6cm

## 2017-07-27 NOTE — PROGRESS NOTES
INITIAL NUTRITION ASSESSMENT     REASON FOR ASSESSMENT:   [x]  RN Referral:    [] MST score >/=2  Malnutrition Screening Tool (MST):  Recently Lost Weight Without Trying: No        MST Score: 0    [] Enteral/Parenteral Nutrition PTA   [x] Pregnant (Not in Labor):  [x] Gestational DM     [] Multigestation   [] Pressure Ulcer/Wound Care needs    NUTRITION ASSESSMENT:   Client History: 27 yrs old Female admitted with single pregnancy, in labor with gestational diabetes. Pt s/p induction and birth of healthy infant. Please consult if dietitian services are indicated and desired.  Thank you.    [] Participated in Interdisciplinary Rounds  [x] [address] [place] [address] Reviewed/Documented  [] Discharge Nutrition Plan     Jair Mccauley RD  PAGER:  [phone]

## 2017-07-27 NOTE — ANESTHESIA PREPROCEDURE EVALUATION
Anesthetic History   No history of anesthetic complications            Review of Systems / Medical History  Patient summary reviewed, nursing notes reviewed and pertinent labs reviewed    Pulmonary  Within defined limits                 Neuro/Psych   Within defined limits           Cardiovascular                  Exercise tolerance: >4 METS     GI/Hepatic/Renal  Within defined limits              Endo/Other    Diabetes: well controlled         Other Findings              Physical Exam    Airway  Mallampati: II  TM Distance: 4 - 6 cm  Neck ROM: normal range of motion   Mouth opening: Normal     Cardiovascular  Regular rate and rhythm,  S1 and S2 normal,  no murmur, click, rub, or gallop             Dental  No notable dental hx       Pulmonary  Breath sounds clear to auscultation               Abdominal  GI exam deferred       Other Findings            Anesthetic Plan    ASA: 2  Anesthesia type: epidural  Risk and benefits fully explained to the patient including bleeding, headache, nerve damage, infection, nausea, back pain, and hemodynamic changes. Patient understands and agrees to the procedure.     Post-op pain plan if not by surgeon: indwelling epidural catheter      Anesthetic plan and risks discussed with: Patient

## 2017-07-27 NOTE — ROUTINE PROCESS
TRANSFER - IN REPORT:    Verbal report received from MALCOLM Cho RN (name) on [employer]  being received from L and D (unit) for routine progression of care      Report consisted of patients Situation, Background, Assessment and   Recommendations(SBAR). Information from the following report(s) SBAR, Intake/Output, MAR and Recent Results was reviewed with the receiving nurse. Opportunity for questions and clarification was provided. Assessment completed upon patients arrival to unit and care assumed. VSS. Oriented to room and unit. Advised to call for assistance to bathroom. Denies needs at this time.

## 2017-07-27 NOTE — PROGRESS NOTES
1925: Received verbal SBAR report from DENTON Newton RN. Assumed care of pt at this time. 0725: Verbal SBAR report given to MALCOLM Cho RN. Relinquished care of pt at this time.

## 2017-07-27 NOTE — PROGRESS NOTES
Vaginal Delivery Procedure Note    Name: Amaury Harris   Medical Record Number: [id number]   YOB: 1985  Today's Date: 2017        Procedure: VAGINAL DELIVERY, compound right hand, tight nuchal delivered via somersault manuever      Anesthesia: yes       Type - 1% xylocaine local:no  Epidural: yes  Extra Procedure Details:       Estimated Blood Loss: 200 ccs    Fetal Description:  male     Anterior shoulder: left    Episiotomy: no   Tear: yes, perineal abrasion, good hemostasis, no repair             Cord Blood Results:   Information for the patient's :  John Junior [[id number]]   No components found for: ABG        Apgars: 8/9    Birth Information:   Information for the patient's :  John Junior [[id number]]      Placenta: delivered at 26 015705, intact, 3 vessel cord     Specimens: Placenta was not sent           Complications:  none     Birth Weight: pending, infant skin to skin    Mother's Condition: good  Baby's Condition: good    I was present for the delivery.   Signed: Megan Sherwood CNM      2017

## 2017-07-27 NOTE — PROGRESS NOTES
0725 Bedside and Verbal shift change report given to [id number] [facility] (oncoming nurse) by JULIETH Cho RN (offgoing nurse). Report included the following information SBAR, Kardex, Procedure Summary, Intake/Output, MAR, Recent Results and Med Rec Status. 4747 Pt request for epidural. IV bolusing. Supplies gathered. Dr Tamar Dalal paged. 9126 Dr Tamar Dalal in room. 9016 Time out.    0759 Epidural placed. Test dose given. 0800 PT SROMed clear fluid.    0801 Fentanyl handed to Dr Tamar Dalal. Bolus given. 1716 Paged Dr Luiza Jones to come for delivery. 7913 Paged Pooja Stackjonel with prompt return call. She is on her way. 2689  of viable boy. CAN x1 tight with hand presentation. [address] and pad placed. Weighed pad. [facility]. 1140 Pt had an episode of vomiting. Phenergan given. See emar. 1147 Bedside and Verbal shift change report given to [address] [facility] [address] (oncoming nurse) by JULIETH Cho RN (offgoing nurse). Report included the following information SBAR, Kardex, Procedure Summary, Intake/Output, MAR, Recent Results and Med Rec Status.

## 2017-07-27 NOTE — PROGRESS NOTES
Patient was visited by [facility] volunteer Mayra Villatoro. Volunteer conducted a Spiritual Care Screening and reported no needs to this . Baby Cotter Card and Spiritual Care literature were provided. Chaplains will continue to follow and will provide pastoral care as needed or requested. Sandrine Giron [facility] Intern  [phone] - Office

## 2017-07-27 NOTE — ANESTHESIA PROCEDURE NOTES
Epidural Block    Start time: 7/27/2017 7:55 AM  End time: 7/27/2017 8:01 AM  Performed by: Isabela Nurse by: Negro Guerra     Pre-Procedure  Indication: at surgeon's request, post-op pain management, procedure for pain, primary anesthetic and labor epidural    Preanesthetic Checklist: patient identified, risks and benefits discussed, anesthesia consent, site marked, patient being monitored, timeout performed and anesthesia consent      Epidural:   Patient position:  Seated  Prep region:  Lumbar  Prep: Chlorhexidine    Location:  L3-4    Needle and Epidural Catheter:   Needle Type:  Tuohy  Needle Gauge:  17 G  Injection Technique:  Loss of resistance using saline  Attempts:  1  Catheter Size:  18 G  Catheter at Skin Depth (cm):  11  Depth in Epidural Space (cm):  5  Events: no blood with aspiration, no cerebrospinal fluid with aspiration, no paresthesia and negative aspiration test    Test Dose:  Lidocaine 1.5% w/ epi and negative    Assessment:   Catheter Secured:  Tegaderm and tape  Insertion:  Uncomplicated  Patient tolerance:  Patient tolerated the procedure well with no immediate complications

## 2017-07-28 LAB
HCT VFR BLD AUTO: 35.4 % (ref 35–45)
HGB BLD-MCNC: 11.8 G/DL (ref 12–16)

## 2017-07-28 PROCEDURE — 85018 HEMOGLOBIN: CPT

## 2017-07-28 PROCEDURE — 85014 HEMATOCRIT: CPT

## 2017-07-28 PROCEDURE — 74011250637 HC RX REV CODE- 250/637: Performed by: ADVANCED PRACTICE MIDWIFE

## 2017-07-28 PROCEDURE — 65270000029 HC RM PRIVATE

## 2017-07-28 PROCEDURE — 36415 COLL VENOUS BLD VENIPUNCTURE: CPT

## 2017-07-28 RX ADMIN — IBUPROFEN 800 MG: 400 TABLET, FILM COATED ORAL at 01:25

## 2017-07-28 NOTE — ANESTHESIA POSTPROCEDURE EVALUATION
7/28/2017  1:14 PM    Laboring Epidural Follow-up Note     Referring physician: Regi Mar MD   Patient status post vaginal delivery with labor epidural    Visit Vitals    /68 (BP 1 Location: Left arm, BP Patient Position: At rest)    Pulse 81    Temp 36.7 °C (98.1 °F)    Resp 16    Ht 5' 2\" (1.575 m)    Wt 96.2 kg (212 lb)    SpO2 96%    Breastfeeding Yes    BMI 38.78 kg/m2       Epidural removed by L&D staff  No sedation, pruritis noted. Adequate analgesia.   No obvious anesthesia complications          Lukas Morataya CRNA

## 2017-07-28 NOTE — ROUTINE PROCESS
Bedside and Verbal shift change report given to MALCOLM Cruz RN  (oncoming nurse) by DENTON Clemente LPN (offgoing nurse). Report given with SBAR, Kardex, Intake/Output, MAR and Recent Results.

## 2017-07-28 NOTE — ROUTINE PROCESS
Bedside and Verbal shift change report given to DENTON Clemente LPN (oncoming nurse) by MALCOLM Street RN (offgoing nurse). Report included the following information SBAR, Kardex, Intake/Output, MAR and Recent Results.

## 2017-07-28 NOTE — PROGRESS NOTES
Pt. Muna Bailey at bedside by father and baby. AAOx4. VSS. Will continue to monitor. Pain 0/10. Educated on pain management. Educated on plan of care. No further questions on concerns at this time. 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 No clots noted. Assessment complete. Assisted and educated with breastfeeding. Callbell within reach. Bed in lowest position. 2320- Bedside and Verbal shift change report given to MALCOLM Shrestha  (oncoming nurse) by PETER Ho (offgoing nurse). Report included the following information SBAR, Kardex, Intake/Output, MAR and Recent Results.

## 2017-07-28 NOTE — PROGRESS NOTES
Progress Note    Patient: Xiomara Garcia MRN: [id number]  SSN: [id number]    YOB: 1985  Age: 27 y.o. Sex: female      Subjective:     Postpartum Day: 1 Vaginal Delivery    The patient is without complaints. The patient is ambulating well. The patient  tolerating a normal diet. The baby is well. Objective:      Patient Vitals for the past 8 hrs:   BP Temp Pulse Resp SpO2   07/28/17 0655 108/68 98.1 °F (36.7 °C) 81 16 96 %     LABS: Recent Results (from the past 24 hour(s))   HEMOGLOBIN    Collection Time: 07/28/17  1:56 AM   Result Value Ref Range    HGB 11.8 (L) 12.0 - 16.0 g/dL   HEMATOCRIT    Collection Time: 07/28/17  1:56 AM   Result Value Ref Range    HCT 35.4 35.0 - 45.0 %          Lochia:  appropriate   Uterine Fundus:   firm, -2     Lab/Data Review: All lab results for the last 24 hours reviewed. Assessment:     Status post: Doing well postpartum vaginal delivery day 1. Plan:     Continue day 1 post-vaginal delivery orders. Postpartum care discussed including diet, ambulation, and actvitiy restrictions.      Signed By: Linda Navarro MD     July 28, 2017

## 2017-07-29 VITALS
DIASTOLIC BLOOD PRESSURE: 71 MMHG | SYSTOLIC BLOOD PRESSURE: 120 MMHG | WEIGHT: 212 LBS | HEIGHT: 62 IN | RESPIRATION RATE: 16 BRPM | TEMPERATURE: 98.5 F | HEART RATE: 90 BPM | OXYGEN SATURATION: 100 % | BODY MASS INDEX: 39.01 KG/M2

## 2017-07-29 PROCEDURE — 74011250637 HC RX REV CODE- 250/637: Performed by: ADVANCED PRACTICE MIDWIFE

## 2017-07-29 RX ORDER — IBUPROFEN 800 MG/1
800 TABLET ORAL
Qty: 60 TAB | Refills: 0 | Status: SHIPPED | OUTPATIENT
Start: 2017-07-29 | End: 2019-04-03

## 2017-07-29 RX ORDER — OXYCODONE AND ACETAMINOPHEN 5; 325 MG/1; MG/1
2 TABLET ORAL
Qty: 30 TAB | Refills: 0 | Status: SHIPPED | OUTPATIENT
Start: 2017-07-29 | End: 2019-04-03

## 2017-07-29 RX ADMIN — IBUPROFEN 800 MG: 400 TABLET, FILM COATED ORAL at 16:29

## 2017-07-29 NOTE — DISCHARGE INSTRUCTIONS
POST DELIVERY DISCHARGE INSTRUCTIONS    Name: Andrea Vera  YOB: 1985  Primary Diagnosis: Active Problems:    IUP (intrauterine pregnancy), incidental (7/17/2017)      Gestational diabetes (7/27/2017)      Follow-Up Care: These are general instructions for a healthy lifestyle:    No smoking/ No tobacco products/ Avoid exposure to second hand smoke    Surgeon General's Warning:  Quitting smoking now greatly reduces serious risk to your health. Obesity, smoking, and sedentary lifestyle greatly increases your risk for illness    A healthy diet, regular physical exercise & weight monitoring are important for maintaining a healthy lifestyle    Recognize signs and symptoms of STROKE:    F-face looks uneven    A-arms unable to move or move unevenly    S-speech slurred or non-existent    T-time-call 911 as soon as signs and symptoms begin-DO NOT go       Back to bed or wait to see if you get better-TIME IS BRAIN. Signed By: Magdalena Mata                                                                                                   Date: 7/29/2017 Time: 2:30 PM    Patient armband removed and given to patient to take home.   Patient was informed of the privacy risks if armband lost or stolen

## 2017-07-29 NOTE — PROGRESS NOTES
Patient discharged per protocol in stable condition. Discharged education reviewed and packet given to patient. E-sign completed. Armbands removed and given to patient. No further needs reported at this time.

## 2017-07-29 NOTE — LACTATION NOTE
Infant under bili lite--out for feeds. 1500 mom set up with double electric pump-to double pump q pc as long as under bili lites.

## 2017-07-29 NOTE — ROUTINE PROCESS
Bedside and Verbal shift change report given to PETER Blair RN (oncoming nurse) by MALCOLM Reyna RN (offgoing nurse). Report included the following information SBAR, Kardex, Intake/Output, MAR and Recent Results.

## 2017-07-29 NOTE — DISCHARGE SUMMARY
Obstetrical Discharge Summary     Name: Jj Doherty MRN: [id number]  SSN: [id number]    YOB: 1985  Age: 27 y.o. Sex: female      Admit Date: 2017    Discharge Date: 2017     Admitting Physician: Yovani Ferguson MD     Attending Physician:  Yovani Ferguson MD     Admission Diagnoses: IUP  Gestational diabetes  IUP (intrauterine pregnancy), incidental    Discharge Diagnoses:   Information for the patient's :  Joseph Mitchell [[id number]]   Delivery of a 3.466 kg male infant via Vaginal, Spontaneous Delivery on 2017 at 8:50 AM  by . Apgars were 9 and 9. Additional Diagnoses:   Hospital Problems  Date Reviewed: 2017          Codes Class Noted POA    Gestational diabetes ICD-10-CM: O24.419  ICD-9-CM: 648.80  2017 Unknown        IUP (intrauterine pregnancy), incidental ICD-10-CM: Z33.1  ICD-9-CM: V22.2  2017 Unknown             Lab Results   Component Value Date/Time    Rubella, External immune 2016    GrBStrep, External neg 2016       Immunization(s): There is no immunization history for the selected administration types on file for this patient. Labs: No results found for this or any previous visit (from the past 24 hour(s)). Exam:  Chest is clear to auscultation. Fundus firm and nontender  Bowel sounds are normal  Legs are normal without tenderness, swelling, or redness    Hospital Course: Normal hospital course following the delivery. Patient Instructions:   Current Discharge Medication List      START taking these medications    Details   ibuprofen (MOTRIN) 800 mg tablet Take 1 Tab by mouth every eight (8) hours as needed. Qty: 60 Tab, Refills: 0      oxyCODONE-acetaminophen (PERCOCET) 5-325 mg per tablet Take 2 Tabs by mouth every four (4) hours as needed. Max Daily Amount: 12 Tabs.   Qty: 30 Tab, Refills: 0         CONTINUE these medications which have NOT CHANGED    Details   metFORMIN (GLUCOPHAGE) 1,000 mg tablet Take 1,000 mg by mouth two (2) times daily (with meals). PRENATAL 47/IRON/FOLATE 1/DHA (PNV-DHA PO) Take  by mouth. Reference my discharge instructions.     Follow-up Appointments   Procedures    FOLLOW UP VISIT Appointment in: 6 Weeks     Standing Status:   Standing     Number of Occurrences:   1     Order Specific Question:   Appointment in     Answer:   6 Weeks        Signed By:  Eduar Banks MD     July 29, 2017

## 2018-06-05 NOTE — IP AVS SNAPSHOT
Jose Jenkins is a 16 year old male     PRESENTING PROBLEM:  Shortness of breath    NURSING ASSESSMENT:  Description:  Wednesday/Thursdy last week stated having trouble taking deep breaths, becoming more bothersome. Today patient feels concerned about it. Very active individuals, able to workout without breathing difficulties. Noticeable raising of shoulders when taking a deep breaths, able to breath per norm. Denies chest pain, fever, color changes, asthma, dizziness.   Onset/duration:  Ongoing since last week   Precip. factors:  Unknown   Associated symptoms:  Feels like it is harder to take a deep breath  Improves/worsens symptoms:  Same   Pain scale (0-10)   0/10  I & O/eating:   Per norm   Activity:  Per norm, very active per mom   Temp.:  Per norm   Allergies: Allergies not on file  Last exam/Treatment:  New to [facility]  Contact Phone Number:  Home number on file    NURSING PLAN: Nursing advice to patient to keep scheduled OV    RECOMMENDED DISPOSITION:  See in 24 hours - for evaluation   Will comply with recommendation: Yes  If further questions/concerns or if symptoms do not improve, worsen or new symptoms develop, call your PCP or [facility] as soon as possible.    NOTES:  Disposition was determined by the first positive assessment question, therefore all previous assessment questions were negative    Guideline used:  Telephone Triage Protocols for Nurses, Fifth Edition, Marizol Lema  Breathing problems  Nursing Judgment    Ivanna Mars RN, BSN      Summary of Care Report The Summary of Care report has been created to help improve care coordination. Users with access to [employer] or [facility] [facility] (Web-based application) may access additional patient information including the Discharge Summary. If you are not currently a [address] user and need more information, please call the number listed below in the [place] [address] section and ask to be connected with Medical Records. Facility Information Name Address Phone [address] [address] [phone] Patient Information Patient Name Sex  Justine Qureshi ([id number]) Female 1985 Discharge Information Admitting Provider Service Area Unit Cruzito Cordon MD / [phone] [address] / [phone] Discharge Provider Discharge Date/Time Discharge Disposition Destination (none) 2017 Afternoon (Pending) AHR (none) Patient Language Language ENGLISH [13] Non-Hospital Problems as of 2017  Never Reviewed Class Noted - Resolved Last Modified Active Problems IUP (intrauterine pregnancy), incidental  2017 - Present 2017 by Cruzito Cordon MD  
  Entered by Cruzito Cordon MD  
  
You are allergic to the following No active allergies Current Discharge Medication List  
  
ASK your doctor about these medications Dose & Instructions Dispensing Information Comments  
 metFORMIN 1,000 mg tablet Commonly known as:  GLUCOPHAGE Dose:  1000 mg Take 1,000 mg by mouth two (2) times daily (with meals). Refills:  0 PNV-DHA PO Take  by mouth. Refills:  0 Follow-up Information Follow up With Details Comments Contact Info None   None (395) Patient stated that they have no PCP Discharge Instructions Pregnancy Precautions: Care Instructions Your Care Instructions There is no sure way to prevent labor before your due date ( labor) or to prevent most other pregnancy problems. But there are things you can do to increase your chances of a healthy pregnancy. Go to your appointments, follow your doctor's advice, and take good care of yourself. Eat well, and exercise (if your doctor agrees). And make sure to drink plenty of water. Follow-up care is a key part of your treatment and safety. Be sure to make and go to all appointments, and call your doctor if you are having problems. It's also a good idea to know your test results and keep a list of the medicines you take. How can you care for yourself at home? · Make sure you go to your prenatal appointments. At each visit, your doctor will check your blood pressure. Your doctor will also check to see if you have protein in your urine. High blood pressure and protein in urine are signs of preeclampsia. This condition can be dangerous for you and your baby. · Drink plenty of fluids, enough so that your urine is light yellow or clear like water. Dehydration can cause contractions. If you have kidney, heart, or liver disease and have to limit fluids, talk with your doctor before you increase the amount of fluids you drink. · Tell your doctor right away if you notice any symptoms of an infection, such as: ¨ Burning when you urinate. ¨ A foul-smelling discharge from your vagina. ¨ Vaginal itching. ¨ Unexplained fever. ¨ Unusual pain or soreness in your uterus or lower belly. · Eat a balanced diet. Include plenty of foods that are high in calcium and iron. ¨ Foods high in calcium include milk, cheese, yogurt, almonds, and broccoli. ¨ Foods high in iron include red meat, shellfish, poultry, eggs, beans, raisins, whole-grain bread, and leafy green vegetables. · Do not smoke.  If you need help quitting, talk to your doctor about stop-smoking programs and medicines. These can increase your chances of quitting for good. · Do not drink alcohol or use illegal drugs. · Follow your doctor's directions about activity. Your doctor will let you know how much, if any, exercise you can do. · Ask your doctor if you can have sex. If you are at risk for early labor, your doctor may ask you to not have sex. · Take care to prevent falls. During pregnancy, your joints are loose, and your balance is off. Sports such as bicycling, skiing, or in-line skating can increase your risk of falling. And don't ride horses or motorcycles, dive, water ski, scuba dive, or parachute jump while you are pregnant. · Avoid getting very hot. Do not use saunas or hot tubs. Avoid staying out in the sun in hot weather for long periods. Take acetaminophen (Tylenol) to lower a high fever. · Do not take any over-the-counter or herbal medicines or supplements without talking to your doctor or pharmacist first. 
When should you call for help? Call 911 anytime you think you may need emergency care. For example, call if: 
· You passed out (lost consciousness). · You have severe vaginal bleeding. · You have severe pain in your belly or pelvis. · You have had fluid gushing or leaking from your vagina and you know or think the umbilical cord is bulging into your vagina. If this happens, immediately get down on your knees so your rear end (buttocks) is higher than your head. This will decrease the pressure on the cord until help arrives. Call your doctor now or seek immediate medical care if: 
· You have signs of preeclampsia, such as: 
¨ Sudden swelling of your face, hands, or feet. ¨ New vision problems (such as dimness or blurring). ¨ A severe headache. · You have any vaginal bleeding. · You have belly pain or cramping. · You have a fever. · You have had regular contractions (with or without pain) for an hour. This means that you have 8 or more within 1 hour or 4 or more in 20 minutes after you change your position and drink fluids. · You have a sudden release of fluid from your vagina. · You have low back pain or pelvic pressure that does not go away. · You notice that your baby has stopped moving or is moving much less than normal. 
Watch closely for changes in your health, and be sure to contact your doctor if you have any problems. Where can you learn more? Go to http://katiuska-elisabeth.info/. Enter [id number] in the search box to learn more about \"Pregnancy Precautions: Care Instructions. \" Current as of: 2017 Content Version: 11.3 © 1887-4039 [employer]. Care instructions adapted under license by "[employer] (which disclaims liability or warranty for this information). If you have questions about a medical condition or this instruction, always ask your healthcare professional. [place] [address] any warranty or liability for your use of this information. Week 38 of Your Pregnancy: Care Instructions Your Care Instructions Believe it or not, your baby is almost here. You may have ideas about your baby's personality because of how much he or she moves. Or you may have noticed how he or she responds to sounds, warmth, cold, and light. You may even know what kind of music your baby likes. By now, you have a better idea of what to expect during delivery. You may have talked about your birth preferences with your doctor. But even if you want a vaginal birth, it is a good idea to learn about  births.  birth means that your baby is born through a cut (incision) in your lower belly. It is sometimes the best choice for the health of the baby and the mother. This care sheet can help you understand  births. It also gives you information about what to expect after your baby is born.  And it helps you understand more about postpartum depression. Follow-up care is a key part of your treatment and safety. Be sure to make and go to all appointments, and call your doctor if you are having problems. It's also a good idea to know your test results and keep a list of the medicines you take. How can you care for yourself at home? Learn about  birth · Most C-sections are unplanned. They are done because of problems that occur during labor. These problems might include: 
¨ Labor that slows or stops. ¨ High blood pressure or other problems for the mother. ¨ Signs of distress in the baby. These signs may include a very fast or slow heart rate. · Although most mothers and babies do well after , it is major surgery. It has more risks than a vaginal delivery. · In some cases, a planned  may be safer than a vaginal delivery. This may be the case if: ¨ The mother has a health problem, such as a heart condition. ¨ The baby isn't in a head-down position for delivery. This is called a breech position. ¨ The uterus has scars from past surgeries. This could increase the chance of a tear in the uterus. ¨ There is a problem with the placenta. ¨ The mother has an infection, such as genital herpes, that could be spread to the baby. ¨ The mother is having twins or more. ¨ The baby weighs 9 to 10 pounds or more. · Because of the risks of , planned C-sections generally should be done only for medical reasons. And a planned  should be done at 39 weeks or later unless there is a medical reason to do it sooner. Know what to expect after delivery, and plan for the first few weeks at home · You, your baby, and your partner or  will get identification bands. Only people with matching bands can  the baby from the nursery. · You will learn how to feed, diaper, and bathe your baby. And you will learn how to care for the umbilical cord stump.  If your baby will be circumcised, you will also learn how to care for that. · Ask people to wait to visit you until you are at home. And ask them to wash their hands before they touch your baby. · Make sure you have another adult in your home for at least 2 or 3 days after the birth. · During the first 2 weeks, limit when friends and family can visit. · Do not allow visitors who have colds or infections. Make sure all visitors are up to date with their vaccinations. Never let anyone smoke around your baby. · Try to nap when the baby naps. Be aware of postpartum depression · \"Baby blues\" are common for the first 1 to 2 weeks after birth. You may cry or feel sad or irritable for no reason. · For some women, these feelings last longer and are more intense. This is called postpartum depression. · If your symptoms last for more than a few weeks or you feel very depressed, ask your doctor for help. · Postpartum depression can be treated. Support groups and counseling can help. Sometimes medicine can also help. Where can you learn more? Go to http://katiuska-elisabeth.info/. Enter B044 in the search box to learn more about \"Week 38 of Your Pregnancy: Care Instructions. \" Current as of: March 16, 2017 Content Version: 11.3 © 0432-6525 [employer]. Care instructions adapted under license by [employer] (which disclaims liability or warranty for this information). If you have questions about a medical condition or this instruction, always ask your healthcare professional. [place] [address] any warranty or liability for your use of this information. Chart Review Routing History No Routing History on File

## 2018-10-17 LAB
ANTIBODY SCREEN, EXTERNAL: NEGATIVE
CHLAMYDIA, EXTERNAL: NEGATIVE
HBSAG, EXTERNAL: NEGATIVE
HIV, EXTERNAL: NEGATIVE
N. GONORRHEA, EXTERNAL: NORMAL
RPR, EXTERNAL: NORMAL
RUBELLA, EXTERNAL: NORMAL

## 2019-03-13 LAB — GRBS, EXTERNAL: NEGATIVE

## 2019-04-01 ENCOUNTER — HOSPITAL ENCOUNTER (INPATIENT)
Age: 34
LOS: 2 days | Discharge: HOME | End: 2019-04-03
Attending: OBSTETRICS & GYNECOLOGY | Admitting: OBSTETRICS & GYNECOLOGY
Payer: COMMERCIAL

## 2019-04-01 PROBLEM — Z3A.39 39 WEEKS GESTATION OF PREGNANCY: Status: ACTIVE | Noted: 2019-04-01

## 2019-04-01 PROBLEM — Z37.9 NORMAL LABOR: Status: ACTIVE | Noted: 2019-04-01

## 2019-04-01 LAB
ABO + RH BLD: NORMAL
BASOPHILS # BLD: 0 K/UL (ref 0–0.1)
BASOPHILS NFR BLD: 0 % (ref 0–2)
BLOOD GROUP ANTIBODIES SERPL: NORMAL
DIFFERENTIAL METHOD BLD: ABNORMAL
EOSINOPHIL # BLD: 0.1 K/UL (ref 0–0.4)
EOSINOPHIL NFR BLD: 1 % (ref 0–5)
ERYTHROCYTE [DISTWIDTH] IN BLOOD BY AUTOMATED COUNT: 14.6 % (ref 11.6–14.5)
GLUCOSE BLD STRIP.AUTO-MCNC: 98 MG/DL (ref 70–110)
HCT VFR BLD AUTO: 38.9 % (ref 35–45)
HGB BLD-MCNC: 12.9 G/DL (ref 12–16)
LYMPHOCYTES # BLD: 2.7 K/UL (ref 0.9–3.6)
LYMPHOCYTES NFR BLD: 24 % (ref 21–52)
MCH RBC QN AUTO: 28.5 PG (ref 24–34)
MCHC RBC AUTO-ENTMCNC: 33.2 G/DL (ref 31–37)
MCV RBC AUTO: 86.1 FL (ref 74–97)
MONOCYTES # BLD: 1 K/UL (ref 0.05–1.2)
MONOCYTES NFR BLD: 9 % (ref 3–10)
NEUTS SEG # BLD: 7.4 K/UL (ref 1.8–8)
NEUTS SEG NFR BLD: 66 % (ref 40–73)
PLATELET # BLD AUTO: 181 K/UL (ref 135–420)
PMV BLD AUTO: 11.8 FL (ref 9.2–11.8)
RBC # BLD AUTO: 4.52 M/UL (ref 4.2–5.3)
SPECIMEN EXP DATE BLD: NORMAL
WBC # BLD AUTO: 11.2 K/UL (ref 4.6–13.2)

## 2019-04-01 PROCEDURE — 85025 COMPLETE CBC W/AUTO DIFF WBC: CPT

## 2019-04-01 PROCEDURE — 75410000000 HC DELIVERY VAGINAL/SINGLE

## 2019-04-01 PROCEDURE — 75410000002 HC LABOR FEE PER 1 HR

## 2019-04-01 PROCEDURE — 86900 BLOOD TYPING SEROLOGIC ABO: CPT

## 2019-04-01 PROCEDURE — 10907ZC DRAINAGE OF AMNIOTIC FLUID, THERAPEUTIC FROM PRODUCTS OF CONCEPTION, VIA NATURAL OR ARTIFICIAL OPENING: ICD-10-PCS | Performed by: OBSTETRICS & GYNECOLOGY

## 2019-04-01 PROCEDURE — 82962 GLUCOSE BLOOD TEST: CPT

## 2019-04-01 PROCEDURE — 75410000003 HC RECOV DEL/VAG/CSECN EA 0.5 HR

## 2019-04-01 PROCEDURE — 65270000029 HC RM PRIVATE

## 2019-04-01 PROCEDURE — 74011250637 HC RX REV CODE- 250/637: Performed by: ADVANCED PRACTICE MIDWIFE

## 2019-04-01 PROCEDURE — 4A0HXCZ MEASUREMENT OF PRODUCTS OF CONCEPTION, CARDIAC RATE, EXTERNAL APPROACH: ICD-10-PCS | Performed by: OBSTETRICS & GYNECOLOGY

## 2019-04-01 PROCEDURE — 74011250636 HC RX REV CODE- 250/636

## 2019-04-01 RX ORDER — NALBUPHINE HYDROCHLORIDE 10 MG/ML
10 INJECTION, SOLUTION INTRAMUSCULAR; INTRAVENOUS; SUBCUTANEOUS
Status: DISCONTINUED | OUTPATIENT
Start: 2019-04-01 | End: 2019-04-01 | Stop reason: HOSPADM

## 2019-04-01 RX ORDER — TERBUTALINE SULFATE 1 MG/ML
0.25 INJECTION SUBCUTANEOUS
Status: DISCONTINUED | OUTPATIENT
Start: 2019-04-01 | End: 2019-04-01 | Stop reason: HOSPADM

## 2019-04-01 RX ORDER — OXYTOCIN/RINGER'S LACTATE 20/1000 ML
999 PLASTIC BAG, INJECTION (ML) INTRAVENOUS ONCE
Status: COMPLETED | OUTPATIENT
Start: 2019-04-01 | End: 2019-04-01

## 2019-04-01 RX ORDER — SODIUM CHLORIDE, SODIUM LACTATE, POTASSIUM CHLORIDE, CALCIUM CHLORIDE 600; 310; 30; 20 MG/100ML; MG/100ML; MG/100ML; MG/100ML
125 INJECTION, SOLUTION INTRAVENOUS CONTINUOUS
Status: DISCONTINUED | OUTPATIENT
Start: 2019-04-01 | End: 2019-04-01 | Stop reason: HOSPADM

## 2019-04-01 RX ORDER — BUTORPHANOL TARTRATE 2 MG/ML
2 INJECTION INTRAMUSCULAR; INTRAVENOUS
Status: DISCONTINUED | OUTPATIENT
Start: 2019-04-01 | End: 2019-04-01 | Stop reason: HOSPADM

## 2019-04-01 RX ORDER — HYDROMORPHONE HYDROCHLORIDE 1 MG/ML
1 INJECTION, SOLUTION INTRAMUSCULAR; INTRAVENOUS; SUBCUTANEOUS
Status: DISCONTINUED | OUTPATIENT
Start: 2019-04-01 | End: 2019-04-01 | Stop reason: HOSPADM

## 2019-04-01 RX ORDER — ONDANSETRON 2 MG/ML
4 INJECTION INTRAMUSCULAR; INTRAVENOUS
Status: DISCONTINUED | OUTPATIENT
Start: 2019-04-01 | End: 2019-04-01 | Stop reason: HOSPADM

## 2019-04-01 RX ORDER — AMOXICILLIN 250 MG
1 CAPSULE ORAL
Status: DISCONTINUED | OUTPATIENT
Start: 2019-04-01 | End: 2019-04-03 | Stop reason: HOSPADM

## 2019-04-01 RX ORDER — LIDOCAINE HYDROCHLORIDE 10 MG/ML
INJECTION INFILTRATION; PERINEURAL
Status: DISCONTINUED
Start: 2019-04-01 | End: 2019-04-01 | Stop reason: WASHOUT

## 2019-04-01 RX ORDER — PROMETHAZINE HYDROCHLORIDE 25 MG/ML
25 INJECTION, SOLUTION INTRAMUSCULAR; INTRAVENOUS
Status: DISCONTINUED | OUTPATIENT
Start: 2019-04-01 | End: 2019-04-03 | Stop reason: HOSPADM

## 2019-04-01 RX ORDER — METHYLERGONOVINE MALEATE 0.2 MG/ML
0.2 INJECTION INTRAVENOUS AS NEEDED
Status: DISCONTINUED | OUTPATIENT
Start: 2019-04-01 | End: 2019-04-01 | Stop reason: HOSPADM

## 2019-04-01 RX ORDER — ZOLPIDEM TARTRATE 5 MG/1
5 TABLET ORAL
Status: DISCONTINUED | OUTPATIENT
Start: 2019-04-01 | End: 2019-04-03 | Stop reason: HOSPADM

## 2019-04-01 RX ORDER — ACETAMINOPHEN 325 MG/1
650 TABLET ORAL
Status: DISCONTINUED | OUTPATIENT
Start: 2019-04-01 | End: 2019-04-03 | Stop reason: HOSPADM

## 2019-04-01 RX ORDER — IBUPROFEN 400 MG/1
800 TABLET ORAL EVERY 8 HOURS
Status: DISCONTINUED | OUTPATIENT
Start: 2019-04-01 | End: 2019-04-01

## 2019-04-01 RX ORDER — OXYTOCIN/RINGER'S LACTATE 20/1000 ML
PLASTIC BAG, INJECTION (ML) INTRAVENOUS
Status: COMPLETED
Start: 2019-04-01 | End: 2019-04-01

## 2019-04-01 RX ORDER — LIDOCAINE HYDROCHLORIDE 10 MG/ML
20 INJECTION, SOLUTION EPIDURAL; INFILTRATION; INTRACAUDAL; PERINEURAL AS NEEDED
Status: DISCONTINUED | OUTPATIENT
Start: 2019-04-01 | End: 2019-04-01 | Stop reason: HOSPADM

## 2019-04-01 RX ORDER — MINERAL OIL
30 OIL (ML) ORAL AS NEEDED
Status: DISCONTINUED | OUTPATIENT
Start: 2019-04-01 | End: 2019-04-01 | Stop reason: HOSPADM

## 2019-04-01 RX ORDER — OXYTOCIN/RINGER'S LACTATE 20/1000 ML
125 PLASTIC BAG, INJECTION (ML) INTRAVENOUS CONTINUOUS
Status: DISCONTINUED | OUTPATIENT
Start: 2019-04-01 | End: 2019-04-01 | Stop reason: HOSPADM

## 2019-04-01 RX ORDER — IBUPROFEN 400 MG/1
800 TABLET ORAL
Status: DISCONTINUED | OUTPATIENT
Start: 2019-04-01 | End: 2019-04-03 | Stop reason: HOSPADM

## 2019-04-01 RX ORDER — GLYBURIDE 2.5 MG/1
5 TABLET ORAL 2 TIMES DAILY WITH MEALS
COMMUNITY
End: 2019-04-03

## 2019-04-01 RX ADMIN — IBUPROFEN 800 MG: 400 TABLET, FILM COATED ORAL at 10:10

## 2019-04-01 RX ADMIN — IBUPROFEN 800 MG: 400 TABLET, FILM COATED ORAL at 17:50

## 2019-04-01 RX ADMIN — Medication 19980 MILLI-UNITS/HR: at 07:10

## 2019-04-01 NOTE — H&P
Ostetrical History and Physical 
Subjective:  
 
Date of Admission: 2019 Patient is a 28 y.o.   female admitted with labor. For Obstetric history, see prenantal. 
 
For Review of Systems, see prenatal 
 
Past Medical History:  
Diagnosis Date  Gestational diabetes History reviewed. No pertinent surgical history. Prior to Admission medications Medication Sig Start Date End Date Taking? Authorizing Provider  
glyBURIDE (DIABETA) 2.5 mg tablet Take 5 mg by mouth two (2) times daily (with meals). Yes Provider, Historical  
PRENATAL 47/IRON/FOLATE 1/DHA (PNV-DHA PO) Take  by mouth. Yes Provider, Historical  
ibuprofen (MOTRIN) 800 mg tablet Take 1 Tab by mouth every eight (8) hours as needed. 17   Sultana Cary MD  
oxyCODONE-acetaminophen (PERCOCET) 5-325 mg per tablet Take 2 Tabs by mouth every four (4) hours as needed. Max Daily Amount: 12 Tabs. 17   Sultana Cary MD  
metFORMIN (GLUCOPHAGE) 1,000 mg tablet Take 1,000 mg by mouth two (2) times daily (with meals). Provider, Historical  
 
No Known Allergies Social History Tobacco Use  Smoking status: Never Smoker  Smokeless tobacco: Never Used Substance Use Topics  Alcohol use: No  
  
Family History Problem Relation Age of Onset  Diabetes Maternal Grandmother  Heart Disease Maternal Grandmother Objective:  
 
Height 5' 4\" (1.626 m), weight 93 kg (205 lb), currently breastfeeding. No data recorded. No intake/output data recorded. No intake/output data recorded. HEENT: No pallor, no jaundice, Thyroid and throat normal 
RESPIRATORY: Clear to A & P 
CVS: pulse reg, HS normal 
ABDOMEN: Gravid. Vertex. EFW=7.75lb +-1lb. No abnormal tenderness. Pelvic: Cervix 9, Effaced: 100% Station:  0 Data Review:  
Recent Results (from the past 24 hour(s)) CBC WITH AUTOMATED DIFF Collection Time: 19  4:30 AM  
Result Value Ref Range WBC 11.2 4.6 - 13.2 K/uL RBC 4.52 4.20 - 5.30 M/uL  
 HGB 12.9 12.0 - 16.0 g/dL HCT 38.9 35.0 - 45.0 % MCV 86.1 74.0 - 97.0 FL  
 MCH 28.5 24.0 - 34.0 PG  
 MCHC 33.2 31.0 - 37.0 g/dL  
 RDW 14.6 (H) 11.6 - 14.5 % PLATELET 056 488 - 447 K/uL MPV 11.8 9.2 - 11.8 FL  
 NEUTROPHILS 66 40 - 73 % LYMPHOCYTES 24 21 - 52 % MONOCYTES 9 3 - 10 % EOSINOPHILS 1 0 - 5 % BASOPHILS 0 0 - 2 %  
 ABS. NEUTROPHILS 7.4 1.8 - 8.0 K/UL  
 ABS. LYMPHOCYTES 2.7 0.9 - 3.6 K/UL  
 ABS. MONOCYTES 1.0 0.05 - 1.2 K/UL  
 ABS. EOSINOPHILS 0.1 0.0 - 0.4 K/UL  
 ABS. BASOPHILS 0.0 0.0 - 0.1 K/UL  
 DF AUTOMATED    
GLUCOSE, POC Collection Time: 19  4:55 AM  
Result Value Ref Range Glucose (POC) 98 70 - 110 mg/dL Monitor:  Reactivity:present Variability:present Baseline:within normal limits Assessment:  
 
Active Problems: 
  Gestational diabetes (2017) Normal labor (2019) [address] multipara in labor in third trimester (2019) 39 weeks gestation of pregnancy (2019) Plan:  
 
AROM,  Clear, but with a very light yellow stain Check labs:  CBC Check  Prenatal: 
 
Disposition:  
 
Type of admit:In-Patient I saw and examined patient. Signed By: Adams Desai MD  
                      2019

## 2019-04-01 NOTE — PROGRESS NOTES
Bedside and Verbal shift change report given to CRISTIANO Canales (oncoming nurse) by DENTON Dean  (offgoing nurse). Report included the following information SBAR, Kardex, Intake/Output, MAR and Recent Results.

## 2019-04-01 NOTE — PROGRESS NOTES
3210 Rn paged ASHANTI Hicks, prompt return call. D/C glyburide and BS protocols since patient was only GDM.

## 2019-04-01 NOTE — PROGRESS NOTES
3142 Bedside and Verbal shift change report given to BOUBACAR Eisenberg RN (oncoming nurse) by Ronaldo Mobley RN (offgoing nurse). Report included the following information SBAR, Kardex, MAR and Recent Results. 0800 Patient up to restroom, patient voided. 1041 Patient ambulated to [facility]. TRANSFER - OUT REPORT: 
 
Verbal report given to Cristina Mondragon RN(name) on Marly Best  being transferred to [facility](unit) for routine progression of care Report consisted of patients Situation, Background, Assessment and  
Recommendations(SBAR). Information from the following report(s) SBAR, Kardex, Intake/Output, MAR and Recent Results was reviewed with the receiving nurse. Lines:  
Peripheral IV 04/01/19 Posterior;Right Hand (Active) Site Assessment Clean, dry, & intact 4/1/2019  8:34 AM  
Phlebitis Assessment 0 4/1/2019  8:34 AM  
Infiltration Assessment 0 4/1/2019  8:34 AM  
Dressing Status Clean, dry, & intact 4/1/2019  8:34 AM  
Dressing Type Tape;Transparent 4/1/2019  8:34 AM  
Hub Color/Line Status Pink 4/1/2019  8:34 AM  
Alcohol Cap Used Yes 4/1/2019  8:34 AM  
  
 
Opportunity for questions and clarification was provided. Patient transported with: 
 Registered Nurse

## 2019-04-01 NOTE — PROGRESS NOTES
Assumed care, report received from MAHOGANY Nascimento, patient sitting in bed talking on the phone, denies needs at this time.

## 2019-04-01 NOTE — PROGRESS NOTES
[address] of patient assumed from CRISTIANO Eisenberg RN and Roberto Colmenares RN. Patient transferred to [facility]U room 243, oriented to room 1640 Care of patient assumed by DENTON Avilez

## 2019-04-01 NOTE — PROGRESS NOTES
0425 Pt arrived  at  with C/O contractions. Pt placed in bed on EFM oriented to room with call bell in reach. SVE /-1 
 
0450 SVE 8/-1 
 
0510 Called Dr Dolly Martinez to attend delivery 0601 Dr Dolly Martinez at the bedside. /-1 AROM at this time. Thin mec. 
 
0650 J. Lashonda Caal at bedside 
 
9625  of viable infant. APGARS 9/9 
 
0709 placenta delivered intact and discarded 
 
0710 Bedside and Verbal shift change report given to Juana Nur RN (oncoming nurse) by Silvana Velasquez RN (offgoing nurse). Report included the following information SBAR, Kardex, Procedure Summary, Intake/Output, MAR, Accordion, Recent Results, Med Rec Status and Quality Measures.

## 2019-04-01 NOTE — PROGRESS NOTES
Vaginal Delivery Procedure Note Name: Jitendra Nails Medical Record Number: [id number] YOB: 1985 Today's Date: 2019 Procedure: VAGINAL DELIVERY Anesthesia: no  
    Type - 1% xylocaine local:no  Epidural: no 
Extra Procedure Details:   
  
Estimated Blood Loss: 200 ccs Fetal Description:  female Anterior shoulder: right Episiotomy: no  
Tear: no 
Degree: N/A degree Cord Blood Results:  
Information for the patient's :  Rania Smoker [[id number]] No components found for: ABG Apgars: 9/9 Birth Information:  
Information for the patient's :  Rania Smoker [[id number]] Placenta: delivered spontaneously at 0709, appears intact, 3 vessel cord Specimens: Placenta was not sent Complications:  none Birth Weight: pending, infant skin to skin Mother's Condition: good Baby's Condition: good I was present for the delivery. Signed: Luciano Shore CNM 2019

## 2019-04-02 LAB
HCT VFR BLD AUTO: 34.3 % (ref 35–45)
HGB BLD-MCNC: 11.2 G/DL (ref 12–16)

## 2019-04-02 PROCEDURE — 85018 HEMOGLOBIN: CPT

## 2019-04-02 PROCEDURE — 65270000029 HC RM PRIVATE

## 2019-04-02 PROCEDURE — 36415 COLL VENOUS BLD VENIPUNCTURE: CPT

## 2019-04-02 PROCEDURE — 85014 HEMATOCRIT: CPT

## 2019-04-02 PROCEDURE — 74011250637 HC RX REV CODE- 250/637: Performed by: ADVANCED PRACTICE MIDWIFE

## 2019-04-02 RX ADMIN — IBUPROFEN 800 MG: 400 TABLET, FILM COATED ORAL at 20:53

## 2019-04-02 RX ADMIN — SENNOSIDES AND DOCUSATE SODIUM 1 TABLET: 8.6; 5 TABLET ORAL at 20:53

## 2019-04-02 NOTE — PROGRESS NOTES
Bedside shift change report given to Fiorella Cruz RN (oncoming nurse) by Teja Rendon RN 
 (offgoing nurse). Report given with GABE, Davy, MAR and Recent Results.

## 2019-04-02 NOTE — PROGRESS NOTES
Bedside and Verbal shift change report given to JAC Mora, RN (oncoming nurse) by PETER Solorio, VEE (offgoing nurse). Report given with Davy BERNAL and MAR.

## 2019-04-02 NOTE — PROGRESS NOTES
Bedside and Verbal shift change report given to RIA Edmond RN (oncoming nurse) by JAC Florez RN (offgoing nurse). Report included the following information SBAR, Kardex, Intake/Output, MAR and Recent Results. 2040- Pt assessment completed, holding infant in bed, family at bedside. Requesting pain medication for cramping at this time and a stool softener. No other questions or concerns, discussed POC for herself and infant. 0700- Bedside and Verbal shift change report given to JAC Florez RN (oncoming nurse) by Herminia Alvarado RN (offgoing nurse). Report included the following information SBAR, Kardex, Intake/Output, MAR and Recent Results.

## 2019-04-02 NOTE — PROGRESS NOTES
Progress Note Patient: Moses Nj MRN: [id number]  SSN: [id number] YOB: 1985  Age: 28 y.o. Sex: female Subjective:  
 
Postpartum Day: 1 Vaginal Delivery The patient is without complaints. The patient is ambulating well. The patient  tolerating a normal diet. The baby is well. Objective:  
  
Patient Vitals for the past 8 hrs: 
 BP Temp Pulse Resp SpO2  
04/01/19 2356 105/61 98.8 °F (37.1 °C) 86 18 100 % LABS: Recent Results (from the past 24 hour(s)) HEMOGLOBIN Collection Time: 04/02/19  2:34 AM  
Result Value Ref Range HGB 11.2 (L) 12.0 - 16.0 g/dL HEMATOCRIT Collection Time: 04/02/19  2:34 AM  
Result Value Ref Range HCT 34.3 (L) 35.0 - 45.0 % Lab Results Component Value Date/Time HGB 11.2 (L) 04/02/2019 02:34 AM  
 
Lab Results Component Value Date/Time HCT 34.3 (L) 04/02/2019 02:34 AM  
  
Lochia:  appropriate Uterine Fundus:   firm, 0 Lab/Data Review: All lab results for the last 24 hours reviewed. Assessment:  
 
Status post: Doing well postpartum vaginal delivery day 1. Plan:  
 
Continue day 1 post-vaginal delivery orders. Postpartum care discussed including diet, ambulation, and actvitiy restrictions. Signed By: Walker Flanagan CNM April 2, 2019

## 2019-04-02 NOTE — PROGRESS NOTES
Bedside shift change report given to  Keith Hays (oncoming nurse) byJAC Mora RN (offgoing nurse). Report included the following information SBAR, Kardex, MAR and Med Rec Status.

## 2019-04-03 VITALS
OXYGEN SATURATION: 98 % | BODY MASS INDEX: 35 KG/M2 | SYSTOLIC BLOOD PRESSURE: 120 MMHG | HEART RATE: 89 BPM | HEIGHT: 64 IN | TEMPERATURE: 97.9 F | RESPIRATION RATE: 17 BRPM | WEIGHT: 205 LBS | DIASTOLIC BLOOD PRESSURE: 83 MMHG

## 2019-04-03 PROCEDURE — 74011250637 HC RX REV CODE- 250/637: Performed by: ADVANCED PRACTICE MIDWIFE

## 2019-04-03 RX ORDER — IBUPROFEN 800 MG/1
800 TABLET ORAL EVERY 8 HOURS
Qty: 60 TAB | Refills: 1 | Status: SHIPPED | OUTPATIENT
Start: 2019-04-03

## 2019-04-03 RX ADMIN — IBUPROFEN 800 MG: 400 TABLET, FILM COATED ORAL at 15:03

## 2019-04-03 NOTE — DISCHARGE SUMMARY
Obstetrical Discharge Summary     Name: Jocelin Chan MRN: [id number]  SSN: [id number]    YOB: 1985  Age: 28 y.o. Sex: female      Admit Date: 2019    Discharge Date: 4/3/2019     Admitting Physician: Chuck Edmonds MD     Attending Physician:  Juliana Mishra MD     Admission Diagnoses: Normal labor [O80, Z37.9]  IUP (intrauterine pregnancy), incidental [Z34.90]    Discharge Diagnoses:   Information for the patient's :  Deepti Perry [[id number]]   Delivery of a 3.035 kg female infant via Vaginal, Spontaneous on 2019 at 7:01 AM  by . Apgars were 9 and 9. Additional Diagnoses:   Hospital Problems  Date Reviewed: 2019          Codes Class Noted POA    Normal labor ICD-10-CM: O80, Z37.9  ICD-9-CM: 907  2019 Yes        Grand multipara in labor in third trimester ICD-10-CM: O09.43  ICD-9-CM: 659.43  2019 Yes        39 weeks gestation of pregnancy ICD-10-CM: Z3A.39  ICD-9-CM: V22.2  2019 Yes        Gestational diabetes ICD-10-CM: O24.419  ICD-9-CM: 648.80  2017 Yes        IUP (intrauterine pregnancy), incidental ICD-10-CM: Z34.90  ICD-9-CM: V22.2  2017 Unknown             Lab Results   Component Value Date/Time    Rubella, External Immune 10/17/2018    GrBStrep, External Negative 2019       Immunization(s): There is no immunization history for the selected administration types on file for this patient. Hospital Course: Normal hospital course following the delivery. Patient Instructions:   Current Discharge Medication List      CONTINUE these medications which have CHANGED    Details   ibuprofen (MOTRIN) 800 mg tablet Take 1 Tab by mouth every eight (8) hours. Indications: Pain  Qty: 60 Tab, Refills: 1         CONTINUE these medications which have NOT CHANGED    Details   PRENATAL 47/IRON/FOLATE 1/DHA (PNV-DHA PO) Take  by mouth.          STOP taking these medications       glyBURIDE (DIABETA) 2.5 mg tablet Comments:   Reason for Stopping: oxyCODONE-acetaminophen (PERCOCET) 5-325 mg per tablet Comments:   Reason for Stopping:         metFORMIN (GLUCOPHAGE) 1,000 mg tablet Comments:   Reason for Stopping:               Reference my discharge instructions.     Follow-up Appointments   Procedures    FOLLOW UP VISIT Appointment in: 6 Weeks     Standing Status:   Standing     Number of Occurrences:   1     Order Specific Question:   Appointment in     Answer:   6 Weeks        Signed By:  Popeye Dasilva CNM     April 3, 2019

## 2019-04-03 NOTE — PROGRESS NOTES
Progress Note Patient: Jh Drew MRN: [id number]  SSN: [id number] YOB: 1985  Age: 28 y.o. Sex: female Subjective:  
 
Postpartum Day: 2 Vaginal Delivery The patient is without complaints. The patient is ambulating well. The patient  tolerating a normal diet. Flatus has been passed. The baby is well. Objective:  
  
Patient Vitals for the past 8 hrs: 
 BP Temp Pulse Resp SpO2  
19 0818 120/83 97.9 °F (36.6 °C) 89 17 98 % 19 0146 108/62 98 °F (36.7 °C) 66 16 100 % LABS: No results found for this or any previous visit (from the past 24 hour(s)). Lochia:  appropriate Uterine Fundus:   firm 0 Lab/Data Review: All lab results for the last 24 hours reviewed. Assessment:  
 
Status post: Doing well postpartum vaginal delivery day 2. Plan:  
 
Continue day 2 post-vaginal delivery orders, discharge today. Postpartum care discussed including diet, ambulation, and actvitiy restrictions. Patient is not breastfeeding. Continue pre- vitamins. Medications as per MRF and discharge instructions. . Follow-up in six weeks. Signed By: Sourav Moya CNM April 3, 2019

## 2019-04-03 NOTE — DISCHARGE INSTRUCTIONS
POST DELIVERY DISCHARGE INSTRUCTIONS    Name: Jitendra Nials  YOB: 1985  Primary Diagnosis: Active Problems:    IUP (intrauterine pregnancy), incidental (2017)      Gestational diabetes (2017)      Normal labor (2019)      [address] multipara in labor in third trimester (2019)      39 weeks gestation of pregnancy (2019)        General:     Diet/Diet Restrictions:  Eight 8-ounce glasses of fluid daily (water, juices); avoid excessive caffeine intake. Meals/snacks as desired which are high in fiber and carbohydrates and low in fat and cholesterol. Physical Activity / Restrictions / Safety:       Avoid intercourse 4-6 weeks, no douching or tampon use. Check with obstetrician before starting or resuming an exercise program.         Discharge Instructions/Special Treatment/Home Care Needs:     Continue prenatal vitamins. Continue to use squirt bottle with warm water on your episiotomy after each bathroom use until bleeding stops. If steri-strips applied to your incision, remove in 7-10 days. Call your doctor for the following:     Fever over 100.3 degrees by mouth. Vaginal bleeding heavier than a normal menstrual period or clot larger than a golf ball. Red streaks or increased swelling of legs, painful red streaks on your breast.  Painful urination, constipation and increased pain or swelling or discharge with your incision. If you feel extremely anxious or overwhelmed. If you have thoughts of harming yourself and/or your baby. Pain Management:     Pain Management:   Take Acetaminophen (Tylenol) or Ibuprofen (Advil, Motrin), as directed for pain. Use a warm Sitz bath 3 times daily to relieve episiotomy or hemorrhoidal discomfort. Heating pad to  incision as needed. For hemorrhoidal discomfort, use Tucks and Anusol cream as needed and directed. Follow-Up Care:      These are general instructions for a healthy lifestyle:    No smoking/ No tobacco products/ Avoid exposure to second hand smoke    Surgeon General's Warning:  Quitting smoking now greatly reduces serious risk to your health. Obesity, smoking, and sedentary lifestyle greatly increases your risk for illness    A healthy diet, regular physical exercise & weight monitoring are important for maintaining a healthy lifestyle    Recognize signs and symptoms of STROKE:    F-face looks uneven    A-arms unable to move or move unevenly    S-speech slurred or non-existent    T-time-call 911 as soon as signs and symptoms begin-DO NOT go       Back to bed or wait to see if you get better-TIME IS BRAIN. Hospital bands removed and shred prior to discharge.         Signed By: Aminah Baldwin RN                                                                                                   Date: 4/3/2019 Time: 1:47 PM